# Patient Record
Sex: MALE | Race: ASIAN | NOT HISPANIC OR LATINO | Employment: FULL TIME | URBAN - METROPOLITAN AREA
[De-identification: names, ages, dates, MRNs, and addresses within clinical notes are randomized per-mention and may not be internally consistent; named-entity substitution may affect disease eponyms.]

---

## 2017-02-08 DIAGNOSIS — E78.5 HYPERLIPIDEMIA LDL GOAL <70: Primary | ICD-10-CM

## 2017-02-08 RX ORDER — ATORVASTATIN CALCIUM 20 MG/1
20 TABLET, FILM COATED ORAL DAILY
Qty: 30 TABLET | Refills: 0 | Status: SHIPPED | OUTPATIENT
Start: 2017-02-08 | End: 2017-03-27

## 2017-02-08 NOTE — TELEPHONE ENCOUNTER
atorvastatin (LIPITOR) 20 MG tablet   Last Written Prescription Date:  12/13/16  Last Fill Quantity: 30,   # refills: 0  Last Office Visit with [facility], P or [facility] prescribing provider: 12/7/15  Future Office visit:   none    Routing refill request to provider for review/approval because:   atorvastatin (LIPITOR) 20 MG tablet.   12/15.  appt letter sent previously. rf?

## 2017-03-27 DIAGNOSIS — E78.5 HYPERLIPIDEMIA LDL GOAL <70: ICD-10-CM

## 2017-03-27 RX ORDER — ATORVASTATIN CALCIUM 20 MG/1
20 TABLET, FILM COATED ORAL DAILY
Qty: 30 TABLET | Refills: 0 | Status: SHIPPED | OUTPATIENT
Start: 2017-03-27 | End: 2017-04-26

## 2017-03-27 NOTE — TELEPHONE ENCOUNTER
atorvastatin (LIPITOR) 20 MG tablet      Last Written Prescription Date:  2/8/2017  Last Fill Quantity: 30,   # refills: 0  Last Office Visit : 12/7/2015  Future Office visit:  None scheduled    Routing refill request to provider for review/approval because:  Yearly visit overdue > 12 months since last seen.  Appointment reminder letter previously sent.  Will pend Rx same as last fill for 30 days only.

## 2017-04-26 DIAGNOSIS — E78.5 HYPERLIPIDEMIA LDL GOAL <70: ICD-10-CM

## 2017-04-26 NOTE — TELEPHONE ENCOUNTER
Atorvastatin 20 mg tabs     Last Written Prescription Date: 3-27-17  Last Fill Quantity: 30, # refills: 0  Last Office Visit :     12-17-15 for shoulder pain only  9-9-14 for hyperlipidemia only  2-7-13 last annual physical    Pending Visit:NONE    Kathleen M Doege RN

## 2017-04-27 RX ORDER — ATORVASTATIN CALCIUM 20 MG/1
TABLET, FILM COATED ORAL
Qty: 30 TABLET | Refills: 0 | Status: SHIPPED | OUTPATIENT
Start: 2017-04-27 | End: 2017-06-02

## 2017-06-02 DIAGNOSIS — E78.5 HYPERLIPIDEMIA LDL GOAL <70: ICD-10-CM

## 2017-06-05 RX ORDER — ATORVASTATIN CALCIUM 20 MG/1
TABLET, FILM COATED ORAL
Qty: 30 TABLET | Refills: 0 | Status: SHIPPED | OUTPATIENT
Start: 2017-06-05 | End: 2017-06-29

## 2017-06-05 NOTE — TELEPHONE ENCOUNTER
Lipitor  Last Written Prescription Date:  4/27/17  Last Fill Quantity: 30,   # refills: 0  Last Office Visit : 12/7/15  Future Office visit:  no    Routing refill request to provider for review/approval because:  Letter sent 12/2016 requesting pt to schedule appt, no pending appt at this time, continue to refill?

## 2017-06-29 DIAGNOSIS — E78.5 HYPERLIPIDEMIA LDL GOAL <70: ICD-10-CM

## 2017-07-07 RX ORDER — ATORVASTATIN CALCIUM 20 MG/1
20 TABLET, FILM COATED ORAL DAILY
Qty: 15 TABLET | Refills: 0 | Status: SHIPPED | OUTPATIENT
Start: 2017-07-07 | End: 2017-08-02

## 2017-07-25 DIAGNOSIS — E78.5 HYPERLIPIDEMIA LDL GOAL <70: ICD-10-CM

## 2017-07-26 RX ORDER — ATORVASTATIN CALCIUM 20 MG/1
TABLET, FILM COATED ORAL
Qty: 15 TABLET | Refills: 0 | OUTPATIENT
Start: 2017-07-26

## 2017-08-02 ENCOUNTER — OFFICE VISIT (OUTPATIENT)
Dept: FAMILY MEDICINE | Facility: CLINIC | Age: 52
End: 2017-08-02

## 2017-08-02 VITALS
DIASTOLIC BLOOD PRESSURE: 72 MMHG | WEIGHT: 156.3 LBS | HEART RATE: 65 BPM | SYSTOLIC BLOOD PRESSURE: 111 MMHG | RESPIRATION RATE: 16 BRPM | BODY MASS INDEX: 23.94 KG/M2

## 2017-08-02 DIAGNOSIS — E78.5 HYPERLIPIDEMIA LDL GOAL <130: Primary | ICD-10-CM

## 2017-08-02 DIAGNOSIS — Z00.00 HEALTH CARE MAINTENANCE: ICD-10-CM

## 2017-08-02 RX ORDER — ATORVASTATIN CALCIUM 20 MG/1
20 TABLET, FILM COATED ORAL DAILY
Qty: 90 TABLET | Refills: 3 | Status: SHIPPED | OUTPATIENT
Start: 2017-08-02 | End: 2018-08-16

## 2017-08-02 RX ORDER — ATORVASTATIN CALCIUM 20 MG/1
20 TABLET, FILM COATED ORAL DAILY
COMMUNITY
End: 2019-04-02

## 2017-08-02 ASSESSMENT — PAIN SCALES - GENERAL: PAINLEVEL: NO PAIN (0)

## 2017-08-02 NOTE — MR AVS SNAPSHOT
After Visit Summary   8/2/2017    Marcello Pastor    MRN: [id number]           Patient Information     Date Of Birth          1965        Visit Information        Provider Department      8/2/2017 5:30 PM Dorota Angel MD PhD [facility]        Today's Diagnoses     Hyperlipidemia LDL goal <130    -  1    Health care maintenance          Care Instructions    Primary Care Center Phone Number [phone]  Primary Care Center Medication Refill Request Information:  * Please contact your pharmacy regarding ANY request for medication refills.  ** [facility] Prescription Fax = [phone]  * Please allow 3 business days for routine medication refills.  * Please allow 5 business days for controlled substance medication refills.     Primary Care Center Test Result notification information:  *You will be notified with in 7-10 days of your appointment day regarding the results of your test.  If you are on MyChart you will be notified as soon as the provider has reviewed the results and signed off on them.      Establish care with Dr Jessica or Dr Walsh             Follow-ups after your visit        Your next 10 appointments already scheduled     Aug 02, 2017  6:45 PM CDT   LAB with [facility] ([facility])    [address] [place] [id number] [phone]           Patient must bring picture ID. Patient should be prepared to give a urine specimen  Please do not eat 10-12 hours before your appointment if you are coming in fasting for labs on lipids, cholesterol, or glucose (sugar). Pregnant women should follow their Care Team instructions. Water with medications is okay. Do not drink coffee or other fluids. If you have concerns about taking  your medications, please ask at office or if scheduling via Askem, send a message by clicking on Secure Messaging, Message Your Care Team.              Future tests that were ordered for  you today     Open Future Orders        Priority Expected Expires Ordered    Lipid Profile Routine 2017            Who to contact     Please call your clinic at [phone] to:    Ask questions about your health    Make or cancel appointments    Discuss your medicines    Learn about your test results    Speak to your doctor   If you have compliments or concerns about an experience at your clinic, or if you wish to file a complaint, please contact [facility] Patient Relations at [phone] or email us at [email or web address]         Additional Information About Your Visit        LitepointharCellufun Information     The 517 travelt is an electronic gateway that provides easy, online access to your medical records. With 19pay, you can request a clinic appointment, read your test results, renew a prescription or communicate with your care team.     To sign up for 19pay visit the website at [email or web address]   You will be asked to enter the access code listed below, as well as some personal information. Please follow the directions to create your username and password.     Your access code is: [email or web address]  Expires: 10/26/2017  3:37 PM     Your access code will  in 90 days. If you need help or a new code, please contact your [facility] Clinic or call [phone] for assistance.        Care EveryWhere ID     This is your Care EveryWhere ID. This could be used by other organizations to access your [place] medical records  [id number]        Your Vitals Were     Pulse Respirations BMI (Body Mass Index)             65 16 23.94 kg/m2          Blood Pressure from Last 3 Encounters:   17 111/72   12/07/15 115/75   14 114/72    Weight from Last 3 Encounters:   17 70.9 kg (156 lb 4.8 oz)   12/07/15 76.9 kg (169 lb 9.6 oz)   14 75.9 kg (167 lb 4.8 oz)              We Performed the Following     Basic metabolic panel           Today's Medication Changes          These changes are accurate as of: 8/2/17  6:33 PM.  If you have any questions, ask your nurse or doctor.               Stop taking these medicines if you haven't already. Please contact your care team if you have questions.     magnesium citrate solution   Stopped by:  Dorota Angel MD PhD           PEG-KCl-NaCl-NaSulf-Na Asc-C 100 G Solr   Commonly known as:  MOVIPREP   Stopped by:  Dorota Angel MD PhD           Simethicone 125 MG Caps   Stopped by:  Dorota Angel MD PhD           tadalafil 10 MG tablet   Commonly known as:  CIALIS   Stopped by:  Dorota Angel MD PhD                Where to get your medicines      These medications were sent to [facility]/pharmacy #[id number] [place] [address]  [address] [place] [id number]     Phone:  [phone]     atorvastatin 20 MG tablet                Primary Care Provider    None Specified       No primary provider on file.        Equal Access to Services     [facility] : Hadkimber malloyo Soamee, waaxda luqadaha, qaybta kaalmada adevanceyaveronica, nilesh hogan . So [facility] [phone].    ATENCIÓN: Si habla español, tiene a ga disposición servicios gratuitos de asistencia lingüística. Jesusame al [phone].    We comply with applicable federal civil rights laws and [place] laws. We do not discriminate on the basis of race, color, national origin, age, disability sex, sexual orientation or gender identity.            Thank you!     Thank you for choosing [facility]  for your care. Our goal is always to provide you with excellent care. Hearing back from our patients is one way we can continue to improve our services. Please take a few minutes to complete the written survey that you may receive in the mail after your visit with us. Thank you!             Your Updated Medication List - Protect others around you: Learn  how to safely use, store and throw away your medicines at www.disposemymeds.org.          This list is accurate as of: 8/2/17  6:33 PM.  Always use your most recent med list.                   Brand Name Dispense Instructions for use Diagnosis    aspirin 81 MG tablet      Take 1 tablet by mouth daily.        * LIPITOR 20 MG tablet   Generic drug:  atorvastatin      Take 20 mg by mouth daily        * atorvastatin 20 MG tablet    LIPITOR    90 tablet    Take 1 tablet (20 mg) by mouth daily    Hyperlipidemia LDL goal <130       * Notice:  This list has 2 medication(s) that are the same as other medications prescribed for you. Read the directions carefully, and ask your doctor or other care provider to review them with you.

## 2017-08-02 NOTE — PATIENT INSTRUCTIONS
Primary Care Center Phone Number [phone]  Primary Care Center Medication Refill Request Information:  * Please contact your pharmacy regarding ANY request for medication refills.  ** [facility] Fax = [phone]  * Please allow 3 business days for routine medication refills.  * Please allow 5 business days for controlled substance medication refills.     Primary Care Center Test Result notification information:  *You will be notified with in 7-10 days of your appointment day regarding the results of your test.  If you are on MyChart you will be notified as soon as the provider has reviewed the results and signed off on them.      Establish care with Dr Jessica or Dr Walsh

## 2017-08-03 NOTE — PROGRESS NOTES
Primary Care Center  Established Patient visit    Name: Marcello Pastor MRN: [id number]     Age: 52 year old           Chief Complaint:    YOB: 1965       CC:needs med refill         HPI and ROS:   HPI:    52 year old male with a PMH of hyperlipidemia who comes in today because he needs a  refill on his Lipitor. He is a previous patient of Dr. Chapin. He has not established care with anyone else. He has not been in since 2015 and has been out of Lipitor now for 1 week. He would like to get a refill.    He did not want to establish care today as he would like to find a male physician in the clinic.      ROS:  A 10-point Review of Systems was performed and is negative other than mentioned in HPI         Past Medical History:     Past Medical History:   Diagnosis Date     Hyperlipidemia 8/9/2011     Thoracic outlet syndrome     No symptoms     Uncomplicated asthma              Past Surgical History:      Past Surgical History:   Procedure Laterality Date     COLONOSCOPY  8/31/2011    Procedure:COLONOSCOPY; Surgeon:VU ELLISON; Location: GI     COLONOSCOPY  06/2016               Immunizations:     Immunization History   Administered Date(s) Administered     Influenza (H1N1) 01/27/2010     TDAP Vaccine (Boostrix) 02/07/2013             Allergies:   No Known Allergies          Medications:     Current Outpatient Prescriptions on File Prior to Visit:  aspirin 81 MG tablet Take 1 tablet by mouth daily.   [DISCONTINUED] atorvastatin (LIPITOR) 20 MG tablet Take 1 tablet (20 mg) by mouth daily     No current facility-administered medications on file prior to visit.          Exam:   /72  Pulse 65  Resp 16  Wt 70.9 kg (156 lb 4.8 oz)  BMI 23.94 kg/m2    General: NAD, alert and conversational  HEENT: PEERL, ears clear, OP - few missing teeth, no lesions, neck supple without masses  CV: Normal S1,S2 with RRR no murmurs, rubs or gallops  Resp: Lungs CTA bilaterally with no wheezes or  crackles  Abd: Soft, NTND with no organomegaly or masses  Legs: no edema  Neuro: motor strength 5/5;  Reflexes 2/4 and equal bilaterally   Skin: No concerning lesions or rashes        Labs:   Pending          Assessment and Plan:     Assessment: 52-year-old male with history of hyperlipidemia who has been on Lipitor and has not reestablished care in the clinic and has run out of his prescription.  Plan  1. Hyperlipidemia. His last cholesterol was in 2014. He's been getting Lipitor refills but not had a recent cholesterol. I did refill the Lipitor and ordered a lipid profile as well as a BMP.  2. Establish care. We talked about coming in for establishing care - he prefers a male physician and I gave him the name of couple of men in the clinic. He will set that up in the near future. I did review his history today as well as medications and immunizations.    I spent  30 minutes with this patient.  > 70% of time spent in counseling and coordination of care      RTC: 3 months    Dorota Angel MD, PhD

## 2017-08-18 DIAGNOSIS — E78.5 HYPERLIPIDEMIA LDL GOAL <130: ICD-10-CM

## 2017-08-18 DIAGNOSIS — Z00.00 HEALTH CARE MAINTENANCE: ICD-10-CM

## 2017-08-18 LAB
ANION GAP SERPL CALCULATED.3IONS-SCNC: 6 MMOL/L (ref 3–14)
BUN SERPL-MCNC: 19 MG/DL (ref 7–30)
CALCIUM SERPL-MCNC: 8.6 MG/DL (ref 8.5–10.1)
CHLORIDE SERPL-SCNC: 104 MMOL/L (ref 94–109)
CHOLEST SERPL-MCNC: 157 MG/DL
CO2 SERPL-SCNC: 28 MMOL/L (ref 20–32)
CREAT SERPL-MCNC: 0.93 MG/DL (ref 0.66–1.25)
GFR SERPL CREATININE-BSD FRML MDRD: 85 ML/MIN/1.7M2
GLUCOSE SERPL-MCNC: 97 MG/DL (ref 70–99)
HDLC SERPL-MCNC: 74 MG/DL
LDLC SERPL CALC-MCNC: 72 MG/DL
NONHDLC SERPL-MCNC: 83 MG/DL
POTASSIUM SERPL-SCNC: 4.4 MMOL/L (ref 3.4–5.3)
SODIUM SERPL-SCNC: 138 MMOL/L (ref 133–144)
TRIGL SERPL-MCNC: 58 MG/DL

## 2018-08-16 DIAGNOSIS — E78.5 HYPERLIPIDEMIA LDL GOAL <130: ICD-10-CM

## 2018-08-16 RX ORDER — ATORVASTATIN CALCIUM 20 MG/1
TABLET, FILM COATED ORAL
Qty: 30 TABLET | Refills: 0 | Status: SHIPPED | OUTPATIENT
Start: 2018-08-16 | End: 2019-04-02

## 2018-08-16 NOTE — TELEPHONE ENCOUNTER
LIPITOR     Last Written Prescription Date:  8-2-17  Last Fill Quantity: 90,   # refills: 3  Last Office Visit : 8-2-18-7  Future Office visit:  none    Routing refill request to RN for review/approval because:  Annual appt was due on Due 8-2-18.

## 2019-03-16 ENCOUNTER — TELEPHONE (OUTPATIENT)
Dept: FAMILY MEDICINE | Facility: CLINIC | Age: 54
End: 2019-03-16

## 2019-03-16 DIAGNOSIS — E78.5 HYPERLIPIDEMIA LDL GOAL <130: ICD-10-CM

## 2019-03-19 RX ORDER — ATORVASTATIN CALCIUM 20 MG/1
20 TABLET, FILM COATED ORAL DAILY
Qty: 15 TABLET | Refills: 0 | Status: SHIPPED | OUTPATIENT
Start: 2019-03-19 | End: 2019-04-02

## 2019-03-19 NOTE — TELEPHONE ENCOUNTER
lipitor     Last Written Prescription Date:  8/16/18  Last Fill Quantity: 30   # refills: 0  Last Office Visit : 8/2/17  Future Office visit:  No future visit    Routing refill request to nurse for review/approval because:  Drug failed the [facility], [facility] or [facility] refill protocol   Radha refill previously given, no future appt

## 2019-04-02 DIAGNOSIS — E78.5 HYPERLIPIDEMIA LDL GOAL <130: ICD-10-CM

## 2019-04-02 RX ORDER — ATORVASTATIN CALCIUM 20 MG/1
20 TABLET, FILM COATED ORAL DAILY
Qty: 30 TABLET | Refills: 0 | Status: SHIPPED | OUTPATIENT
Start: 2019-04-02 | End: 2019-04-16

## 2019-04-02 NOTE — TELEPHONE ENCOUNTER
lipitor   Last Written Prescription Date: 3/19/19  Last Fill Quantity:15,   # refills: 0  Last Office Visit : 8/2/17  Future Office visit:  4/16/19    Routing refill request to nurse for review/approval because:  Radha refills have been provided. Pt now has pending appt

## 2019-04-02 NOTE — TELEPHONE ENCOUNTER
Health Call Center    Phone Message    May a detailed message be left on voicemail: yes    Reason for Call: Medication Refill Request    Has the patient contacted the pharmacy for the refill? Yes   Name of medication being requested: atorvastatin (LIPITOR) 20 MG tablet  Provider who prescribed the medication: Dr. Dorota Angel  Pharmacy: [facility]/PHARMACY #[id number] [facility] [address] [place] [address] AT [address]  Date medication is needed: Next available--pt is establishing care on 4/16 with resident, and currently has a week's supply left. He is wondering if he can be given enough medication to last until he is seen. Please advise.    Action Taken: Message routed to:  [facility] Med Refills

## 2019-04-16 ENCOUNTER — ANCILLARY PROCEDURE (OUTPATIENT)
Dept: GENERAL RADIOLOGY | Facility: CLINIC | Age: 54
End: 2019-04-16
Attending: INTERNAL MEDICINE
Payer: COMMERCIAL

## 2019-04-16 ENCOUNTER — OFFICE VISIT (OUTPATIENT)
Dept: INTERNAL MEDICINE | Facility: CLINIC | Age: 54
End: 2019-04-16
Payer: COMMERCIAL

## 2019-04-16 VITALS
WEIGHT: 167.1 LBS | RESPIRATION RATE: 16 BRPM | DIASTOLIC BLOOD PRESSURE: 79 MMHG | SYSTOLIC BLOOD PRESSURE: 122 MMHG | BODY MASS INDEX: 25.6 KG/M2 | HEART RATE: 91 BPM

## 2019-04-16 DIAGNOSIS — Z00.00 ENCOUNTER FOR GENERAL HEALTH EXAMINATION: Primary | ICD-10-CM

## 2019-04-16 DIAGNOSIS — J45.30 MILD PERSISTENT ASTHMA WITHOUT COMPLICATION: ICD-10-CM

## 2019-04-16 DIAGNOSIS — E78.5 HYPERLIPIDEMIA LDL GOAL <130: ICD-10-CM

## 2019-04-16 RX ORDER — ATORVASTATIN CALCIUM 20 MG/1
20 TABLET, FILM COATED ORAL DAILY
Qty: 90 TABLET | Refills: 3 | Status: SHIPPED | OUTPATIENT
Start: 2019-04-16 | End: 2020-05-30

## 2019-04-16 RX ORDER — ALBUTEROL SULFATE 90 UG/1
2 AEROSOL, METERED RESPIRATORY (INHALATION) EVERY 6 HOURS
Qty: 8.5 G | Refills: 3 | Status: SHIPPED | OUTPATIENT
Start: 2019-04-16 | End: 2020-04-16

## 2019-04-16 SDOH — HEALTH STABILITY: MENTAL HEALTH: HOW OFTEN DO YOU HAVE A DRINK CONTAINING ALCOHOL?: 2-3 TIMES A WEEK

## 2019-04-16 ASSESSMENT — ENCOUNTER SYMPTOMS
SNORES LOUDLY: 1
NAIL CHANGES: 0
POSTURAL DYSPNEA: 0
SHORTNESS OF BREATH: 0
COUGH: 1
HEMOPTYSIS: 0
COUGH DISTURBING SLEEP: 1
POSTURAL DYSPNEA: 0
HEMOPTYSIS: 0
SNORES LOUDLY: 1
SPUTUM PRODUCTION: 0
SHORTNESS OF BREATH: 0
SPUTUM PRODUCTION: 0
COUGH DISTURBING SLEEP: 1
SKIN CHANGES: 0
WHEEZING: 1
NAIL CHANGES: 0
POOR WOUND HEALING: 0
COUGH: 1
DYSPNEA ON EXERTION: 0
POOR WOUND HEALING: 0
WHEEZING: 1
SKIN CHANGES: 0
DYSPNEA ON EXERTION: 0

## 2019-04-16 ASSESSMENT — PAIN SCALES - GENERAL: PAINLEVEL: NO PAIN (0)

## 2019-04-16 NOTE — PROGRESS NOTES
PRIMARY CARE CENTER     History of Present Illness:       Marcello Pastor is a 54 year old male with past medical history of hyperlipidemia, asthma, thoracic outlet syndrome [facility]    Hyperlipidemia  Patient has been on Lipitor for several years without adverse effects.  He is running out of his prescription and states that he needed to connect with a primary care physician in order to receive a refill.  Cardiovascular risk factors are low in this patient.  He does not smoke, he does not have hypertension, he is non-diabetic.    Asthma concerns  Patient has a history of childhood asthma from ages [age] years old.  Over the last 6 months he reports wheezing and cough at nighttime and in the mornings.  He reports that his symptoms are relieved with use of his son's albuterol inhaler.  At the beginning of these episodes he reported a viral upper respiratory infection that he thinks may have triggered his asthma symptoms.  He denies fevers, chills, rhinorrhea, congestion, watery eyes.  No pets are in the home.  No new detergents are in use.    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  He is  with one child.  He is a non-smoker, drinks 3 glasses of wine total per week, denies use of recreational drugs and herbal medications.  He is due for colonoscopy in 2021.  No sexual concerns today.    Problem, Medication and Allergy Lists were reviewed and are current.  Patient is a new patient to this clinic and so  I reviewed/updated the Past Medical History, the Family History and the Social History.       Review of Systems:     Answers for HPI/ROS submitted by the patient on 4/16/2019   General Symptoms: No  Skin Symptoms: Yes  HENT Symptoms: No  EYE SYMPTOMS: No  HEART SYMPTOMS: No  LUNG SYMPTOMS: Yes  INTESTINAL SYMPTOMS: No  URINARY SYMPTOMS: No  REPRODUCTIVE SYMPTOMS: No  SKELETAL SYMPTOMS: No  BLOOD SYMPTOMS: No  NERVOUS SYSTEM SYMPTOMS: No  MENTAL  HEALTH SYMPTOMS: No  Changes in hair: No  Changes in moles/birth marks: No  Itching: Yes  Rashes: No  Changes in nails: No  Acne: No  Change in facial hair: No  Warts: No  Non-healing sores: No  Scarring: No  Flaking of skin: No  Color changes of hands/feet in cold : No  Sun sensitivity: No  Skin thickening: No  Cough: Yes  Sputum or phlegm: No  Coughing up blood: No  Difficulty breating or shortness of breath: No  Snoring: Yes  Wheezing: Yes  Difficulty breathing on exertion: No  Nighttime Cough: Yes  Difficulty breathing when lying flat: No     Past Medical and Surgical History:     Patient Active Problem List   Diagnosis     Hyperlipidemia       Social History:     Social History     Socioeconomic History     Marital status:      Spouse name: Not on file     Number of children: 1     Years of education: Not on file     Highest education level: Not on file   Occupational History     Occupation: professor     Employer: [employer]   Tobacco Use     Smoking status: Former Smoker     Types: Cigarettes     Last attempt to quit: 1997     Years since quittin.7     Smokeless tobacco: Never Used   Substance and Sexual Activity     Alcohol use: Yes     Alcohol/week: 1.8 oz     Types: 3 Glasses of wine per week     Frequency: 2-3 times a week     Drug use: No     Sexual activity: Yes     Partners: Female     Birth control/protection: Condom   Social History Narrative    Professor in [employer].    1 child              Family History:      Family History   Problem Relation Age of Onset     Asthma Mother      Colon Cancer Mother      Coronary Artery Disease Early Onset Father      Diabetes Father      Coronary Artery Disease Early Onset Paternal Grandmother      Asthma Son          Medications/Allergies:     Medication List:   Current Outpatient Medications   Medication Sig     albuterol (PROAIR HFA/PROVENTIL HFA/VENTOLIN HFA) 108 (90 Base) MCG/ACT inhaler Inhale 2 puffs into the  lungs every 6 hours As needed for wheezing     aspirin 81 MG tablet Take 1 tablet by mouth daily.     atorvastatin (LIPITOR) 20 MG tablet Take 1 tablet (20 mg) by mouth daily     mometasone (ASMANEX) 110 MCG/INH inhaler Inhale 1 puff into the lungs every evening     No current facility-administered medications for this visit.        Allergy List: All allergies reviewed and addressed      Physical Exam:         /79   Pulse 91   Resp 16   Wt 75.8 kg (167 lb 1.6 oz)   BMI 25.60 kg/m    Body mass index is 25.6 kg/m .  Vitals were reviewed  GENERAL: well-appearing, alert and no distress  EYES: EOMI,  sclera-nonicteric  HENT: ear canals and TM's normal; oropharynx without ulcers or lesions  NECK: no cervical lymphadenopathy, no asymmetry, masses, or scars  RESP: normal respiratory effort on room air, CTAB - no rales, rhonchi or wheezes  CV: regular rates and rhythm, normal S1 S2, no S3 or S4, no murmur, click or rub  ABDOMEN: normoactive bowel sounds, soft, nontender, nondistended, no HSM or masses   MS/Ext: WWP  SKIN: no suspicious lesions or rashes  NEURO: AOx3, normal strength and tone and reflexes, spontaneous movement of all extremities  PSYCH: interactive, affect normal/bright, speech normal, mentation and judgment appear normal  LYMPHATICS: no cervical, supraclavicular adenopathy    Results:     No new laboratory or imaging results associated with this visit.    Assessment and Plan:     Marcello was seen today for establish care.    Diagnoses and all orders for this visit:    Encounter for general health examination  Discussed 1-time screening for HCV and HIV and patient would like to add on these tests to lipids today.   -     HCV Screen; Future  -     HIV Antigen Antibody Combo; Future    Hyperlipidemia LDL goal <130  Lipids have been well-controlled on lipitor. He continues to use it, thus testing completed today will represent cholesterol levels on treatment.   -     Comprehensive metabolic panel;  Future  -     Lipid Profile NON-FASTING; Future  -     atorvastatin (LIPITOR) 20 MG tablet; Take 1 tablet (20 mg) by mouth daily    Mild persistent asthma without complication  Surprising that patient's asthma would recur suddenly without particular trigger, thus will complete additional testing for work-up. Due to frequency of patient's symptoms, will place in on daily medication in addition to PRN.   -     albuterol (PROAIR HFA/PROVENTIL HFA/VENTOLIN HFA) 108 (90 Base) MCG/ACT inhaler; Inhale 2 puffs into the lungs every 6 hours As needed for wheezing  -     mometasone (ASMANEX) 110 MCG/INH inhaler; Inhale 1 puff into the lungs every evening  -     XR Chest 2 Views; Future  -     General PFT Lab (Please always keep checked); Future  -     Pulmonary Function Test; Future  - Clinic f/u in 3 months to assess if medications can be stepped down      Options for treatment and follow-up care were reviewed with the patient. Marcello Pastor engaged in the decision making process and verbalized understanding of the options discussed and agreed with the final plan.    Tish Mccray MD, PhD  Internal Medicine, PGY-1  Pager: [phone]    Apr 16, 2019       Patient was seen/plan of care discussed with Dr. Walls.    Attending Addendum:  Patient seen and examined with resident in clinic today.  Pertinent portions of history and exam were independently verified by myself.  I agree with the exam and plan as outlined above with the following modifications: none.  Adeola Walls MD  Internal Medicine

## 2019-04-16 NOTE — PROGRESS NOTES
The 10-year ASCVD risk score (Bill HENRY Jr., et al., 2013) is: 2.5%    Values used to calculate the score:      Age: 54 years      Sex: Male      Is Non- : No      Diabetic: No      Tobacco smoker: No      Systolic Blood Pressure: 122 mmHg      Is BP treated: No      HDL Cholesterol: 74 mg/dL      Total Cholesterol: 157 mg/dL

## 2019-04-16 NOTE — NURSING NOTE
Chief Complaint   Patient presents with     Establish Care     pt is here to re-establish care with PCP       Beth Josue CMA at 12:01 PM on 4/16/2019

## 2019-04-16 NOTE — PATIENT INSTRUCTIONS
[facility] Phone Number [phone]  [facility] Medication Refill Request Information:  * Please contact your pharmacy regarding ANY request for medication refills.  ** [facility] Prescription Fax = [phone]  * Please allow 3 business days for routine medication refills.  * Please allow 5 business days for controlled substance medication refills.     [facility] Test Result notification information:  *You will be notified with in 7-10 days of your appointment day regarding the results of your test.  If you are on MyChart you will be notified as soon as the provider has reviewed the results and signed off on them.               [facility]         Internal Medicine Resident                   Continuity Clinic    Who We Are    Resident Continuity Clinic is a part of the [facility].  Resident physicians see patients independently and establish a relationship with them over the course of their three-year residency program.  As with the Primary Care Clinic, our Resident Continuity Clinic models a group practice.  If your doctor is not available, you will be able to see another resident physician.  At the end of a resident s training, patients will be transitioned to a new resident physician for ongoing care.     We treat patients with a wide array of medical needs from routine physicals, to acute illnesses, to diabetes and blood pressure management, to complex medical illness.  What is a Resident Physician?    Resident physicians hold medical degrees and are doctors. They are training to become specialists in Internal Medicine. They work under the supervision of board-certified faculty physicians.  Expectations for Your Care    We strive to provide accessible, quality care at all times.    In order to provide this care, it is best to see your primary care resident doctor consistently rather switching between providers.  In the event you do see another physician, you  should schedule a follow-up visit with your usual primary care doctor.    If you are transitioning your care from another clinic, it is helpful to have your records available for your doctor to review.    We do not prescribe controlled substances, such as ADD medications or narcotic pain medications, on your first visit.  We will review your health records and concerns prior to devising a treatment plan with you in order to provide the best care.      Clinic Services     Extended clinic hours; patient  to help navigate your visit;  parking; laboratory and imaging services with evening and weekend hours    Multiple medical and surgical specialties in one building    Complementary services, including Nutrition, Integrative Medicine, Pharmacy consultations, Mental and Behavioral Health, Sports Medicine and Physical Therapy    Thank You    We would like to thank you for choosing the [facility] Resident Continuity Clinic for your primary care. You are making a priceless contribution to the training of the next generation of health care practitioners.     Contact us at [phone] for appointments or questions.    Resident Clinic Hours are Tuesdays and Thursdays, 7:30am-5:00pm    Residents   Bhavik Hill MD  (Male)   Yovana Ascencio MD (Female)  Beth Pinzon MD   (Female)   Agnes Coats MD   (Female)   Paul Garcia MD  (Male)   Zafar Issa MD  (Male)   Sobeida Corrales MD    (Female)   Mikie Gibbs MD  (Male)   Nitin Edgar MD  (Male)    Sharon Wheat MD  (Female)   Sean David MD  (Male)   Rosaline Mccray MD  (Female)   Josiah Lindsay MD   (Female)   Alfred Chang MD  (Male)   Duncan Chakraborty MD  (Male)   Zafar Leung MD (Male)   Vadim Villegas MD  (Male)   Samreen Foreman MD (Female)    Paola Cooper MD (Female)   Paige Siegel MD  (Male)   May Weaver MD    (Female)   Christina Moss MD  (Female)    Supervising  Physicians   MD Diamond Chew, MD Jordin Landrum, MD Jeff Walsh, MD Adeola Walls, MD Mandy Weiner, MD Rosalio Benítez, MD Leonarda Lehman MD

## 2019-04-17 DIAGNOSIS — E78.5 HYPERLIPIDEMIA LDL GOAL <130: ICD-10-CM

## 2019-04-17 DIAGNOSIS — Z00.00 ENCOUNTER FOR GENERAL HEALTH EXAMINATION: ICD-10-CM

## 2019-04-17 LAB
ALBUMIN SERPL-MCNC: 3.9 G/DL (ref 3.4–5)
ALP SERPL-CCNC: 41 U/L (ref 40–150)
ALT SERPL W P-5'-P-CCNC: 41 U/L (ref 0–70)
ANION GAP SERPL CALCULATED.3IONS-SCNC: 5 MMOL/L (ref 3–14)
AST SERPL W P-5'-P-CCNC: 28 U/L (ref 0–45)
BILIRUB SERPL-MCNC: 1.1 MG/DL (ref 0.2–1.3)
BUN SERPL-MCNC: 20 MG/DL (ref 7–30)
CALCIUM SERPL-MCNC: 9 MG/DL (ref 8.5–10.1)
CHLORIDE SERPL-SCNC: 105 MMOL/L (ref 94–109)
CHOLEST SERPL-MCNC: 168 MG/DL
CO2 SERPL-SCNC: 27 MMOL/L (ref 20–32)
CREAT SERPL-MCNC: 0.92 MG/DL (ref 0.66–1.25)
GFR SERPL CREATININE-BSD FRML MDRD: >90 ML/MIN/{1.73_M2}
GLUCOSE SERPL-MCNC: 99 MG/DL (ref 70–99)
HCV AB SERPL QL IA: NONREACTIVE
HDLC SERPL-MCNC: 67 MG/DL
HIV 1+2 AB+HIV1 P24 AG SERPL QL IA: NONREACTIVE
LDLC SERPL CALC-MCNC: 88 MG/DL
NONHDLC SERPL-MCNC: 101 MG/DL
POTASSIUM SERPL-SCNC: 4 MMOL/L (ref 3.4–5.3)
PROT SERPL-MCNC: 7.3 G/DL (ref 6.8–8.8)
SODIUM SERPL-SCNC: 137 MMOL/L (ref 133–144)
TRIGL SERPL-MCNC: 67 MG/DL

## 2019-04-22 NOTE — NURSING NOTE
Chief Complaint   Patient presents with     Recheck Medication     pt is here for a medication follow up      Beth Josue CMA at 5:32 PM on 8/2/2017       no

## 2019-04-23 DIAGNOSIS — J45.30 MILD PERSISTENT ASTHMA WITHOUT COMPLICATION: ICD-10-CM

## 2019-04-23 LAB
EXPTIME-%CHANGE-CHLG: 158 %
EXPTIME-CHLG: 7.7 SEC
EXPTIME-PRE: 2.98 SEC
FEF2575-%CHANGE-CHLG: -29 %
FEF2575-%PRED-CHLG: 73 %
FEF2575-%PRED-PRE: 104 %
FEF2575-PRE: 3.28 L/SEC
FEF2575-PRED: 3.13 L/SEC
FEFMAX-%PRED-PRE: 105 %
FEFMAX-PRE: 10.02 L/SEC
FEFMAX-PRED: 9.5 L/SEC
FEV1-%PRED-PRE: 95 %
FEV1-PRE: 3.32 L
FEV1FEV6-PRE: 83 %
FEV1FEV6-PRED: 80 %
FEV1FVC-PRE: 83 %
FEV1FVC-PRED: 79 %
FIFMAX-%CHANGE-CHLG: -1 %
FIFMAX-CHLG: 5.43 L/SEC
FIFMAX-PRE: 5.49 L/SEC
FVC-%PRED-PRE: 90 %
FVC-PRE: 4 L
FVC-PRED: 4.4 L

## 2019-04-29 ENCOUNTER — OFFICE VISIT (OUTPATIENT)
Dept: INTERNAL MEDICINE | Facility: CLINIC | Age: 54
End: 2019-04-29
Payer: COMMERCIAL

## 2019-04-29 VITALS
WEIGHT: 162 LBS | DIASTOLIC BLOOD PRESSURE: 78 MMHG | HEART RATE: 82 BPM | BODY MASS INDEX: 24.81 KG/M2 | OXYGEN SATURATION: 95 % | SYSTOLIC BLOOD PRESSURE: 129 MMHG

## 2019-04-29 DIAGNOSIS — R06.83 SNORING: Primary | ICD-10-CM

## 2019-04-29 DIAGNOSIS — L50.9 URTICARIA: ICD-10-CM

## 2019-04-29 DIAGNOSIS — K57.30 DIVERTICULOSIS OF LARGE INTESTINE WITHOUT HEMORRHAGE: ICD-10-CM

## 2019-04-29 LAB
BASOPHILS # BLD AUTO: 0.1 10E9/L (ref 0–0.2)
BASOPHILS NFR BLD AUTO: 1.1 %
CRP SERPL-MCNC: <2.9 MG/L (ref 0–8)
DIFFERENTIAL METHOD BLD: NORMAL
EOSINOPHIL # BLD AUTO: 0.1 10E9/L (ref 0–0.7)
EOSINOPHIL NFR BLD AUTO: 1.2 %
ERYTHROCYTE [DISTWIDTH] IN BLOOD BY AUTOMATED COUNT: 13.2 % (ref 10–15)
ERYTHROCYTE [SEDIMENTATION RATE] IN BLOOD BY WESTERGREN METHOD: 9 MM/H (ref 0–20)
HCT VFR BLD AUTO: 45 % (ref 40–53)
HGB BLD-MCNC: 14.7 G/DL (ref 13.3–17.7)
IMM GRANULOCYTES # BLD: 0 10E9/L (ref 0–0.4)
IMM GRANULOCYTES NFR BLD: 0.2 %
LYMPHOCYTES # BLD AUTO: 1.8 10E9/L (ref 0.8–5.3)
LYMPHOCYTES NFR BLD AUTO: 32 %
MCH RBC QN AUTO: 29.6 PG (ref 26.5–33)
MCHC RBC AUTO-ENTMCNC: 32.7 G/DL (ref 31.5–36.5)
MCV RBC AUTO: 91 FL (ref 78–100)
MONOCYTES # BLD AUTO: 0.4 10E9/L (ref 0–1.3)
MONOCYTES NFR BLD AUTO: 7.2 %
NEUTROPHILS # BLD AUTO: 3.3 10E9/L (ref 1.6–8.3)
NEUTROPHILS NFR BLD AUTO: 58.3 %
NRBC # BLD AUTO: 0 10*3/UL
NRBC BLD AUTO-RTO: 0 /100
PLATELET # BLD AUTO: 196 10E9/L (ref 150–450)
RBC # BLD AUTO: 4.97 10E12/L (ref 4.4–5.9)
TSH SERPL DL<=0.005 MIU/L-ACNC: 2.34 MU/L (ref 0.4–4)
WBC # BLD AUTO: 5.7 10E9/L (ref 4–11)

## 2019-04-29 RX ORDER — MULTIVITAMIN,THERAPEUTIC
1 TABLET ORAL DAILY
COMMUNITY
Start: 2019-04-29

## 2019-04-29 ASSESSMENT — PAIN SCALES - GENERAL: PAINLEVEL: NO PAIN (0)

## 2019-04-29 NOTE — PATIENT INSTRUCTIONS
[facility] Medication Refill Request Information:  * Please contact your pharmacy regarding ANY request for medication refills.  ** [facility] Prescription Fax = [phone]  * Please allow 3 business days for routine medication refills.  * Please allow 5 business days for controlled substance medication refills.     [facility] Test Result notification information:  *You will be notified with in 7-10 days of your appointment day regarding the results of your test.  If you are on MyChart you will be notified as soon as the provider has reviewed the results and signed off on them.    [facility]: [phone]

## 2019-04-29 NOTE — PROGRESS NOTES
HPI  54-year-old  presents today for physical examination.  He has been doing well.  He has a son with asthma and he has had some intermittent cough and wheezing that is been using a Ventolin inhaler for.  We just reviewed his recent methacholine challenge test however which was entirely negative.  He states that the cough on the breathing has improved recently.  He is otherwise been doing limited physical activity due to his job responsibilities.  In the past he been more physically active but has not found the time recently and we discussed this.  He has a history of recurrent urticaria.  Is been present for several years occurs intermittently he is not able to identify any allergens or precipitating events.  He does not think it is related to stress this is been under a lot of stress recently has not had any recent urticaria.  He has had no other rash and his review of systems is otherwise entirely negative except for the breathing symptoms.  His wife is expressed concern however regarding sleep apnea he snores and she is reporting observed apneic episodes.  He is tired during the day but he does need his wife's alarm to wake him up in the morning as he goes to bed late at night.  He admits that he could be chronically sleep deprived.  Otherwise he has had no recent symptoms or problems.  Past Medical History:   Diagnosis Date     Diverticulosis      Hyperlipidemia 8/9/2011     Thoracic outlet syndrome     No symptoms     Uncomplicated asthma      Past Surgical History:   Procedure Laterality Date     COLONOSCOPY  8/31/2011    Procedure:COLONOSCOPY; Surgeon:VU ELLISON; Location: GI     COLONOSCOPY  06/2016     Family History   Problem Relation Age of Onset     Asthma Mother      Colon Cancer Mother      Coronary Artery Disease Early Onset Father      Diabetes Father      Coronary Artery Disease Early Onset Paternal Grandmother      Asthma Son      Social History      Socioeconomic History     Marital status:      Spouse name: None     Number of children: 1     Years of education: None     Highest education level: None   Occupational History     Occupation: professor     Employer: [employer]   Social Needs     Financial resource strain: None     Food insecurity:     Worry: None     Inability: None     Transportation needs:     Medical: None     Non-medical: None   Tobacco Use     Smoking status: Former Smoker     Types: Cigarettes     Last attempt to quit: 1997     Years since quittin.7     Smokeless tobacco: Never Used   Substance and Sexual Activity     Alcohol use: Yes     Alcohol/week: 1.8 oz     Types: 3 Glasses of wine per week     Frequency: 2-3 times a week     Drug use: No     Sexual activity: Yes     Partners: Female     Birth control/protection: Condom   Lifestyle     Physical activity:     Days per week: None     Minutes per session: None     Stress: None   Relationships     Social connections:     Talks on phone: None     Gets together: None     Attends Mandaen service: None     Active member of club or organization: None     Attends meetings of clubs or organizations: None     Relationship status: None     Intimate partner violence:     Fear of current or ex partner: None     Emotionally abused: None     Physically abused: None     Forced sexual activity: None   Other Topics Concern     Parent/sibling w/ CABG, MI or angioplasty before 65F 55M? 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    1 child      Answers for HPI/ROS submitted by the patient on 2019   General Symptoms: No  Skin Symptoms: Yes  HENT Symptoms: No  EYE SYMPTOMS: No  HEART SYMPTOMS: No  LUNG SYMPTOMS: Yes  INTESTINAL SYMPTOMS: No  URINARY SYMPTOMS: No  REPRODUCTIVE SYMPTOMS: No  SKELETAL SYMPTOMS: No  BLOOD SYMPTOMS: No  NERVOUS SYSTEM SYMPTOMS: No  MENTAL HEALTH SYMPTOMS: No  Changes in hair: No  Changes in moles/birth  marks: No  Itching: Yes  Rashes: No  Changes in nails: No  Acne: No  Change in facial hair: No  Warts: No  Non-healing sores: No  Scarring: No  Flaking of skin: No  Color changes of hands/feet in cold : No  Sun sensitivity: No  Skin thickening: No  Cough: Yes  Sputum or phlegm: No  Coughing up blood: No  Difficulty breating or shortness of breath: No  Snoring: Yes  Wheezing: Yes  Difficulty breathing on exertion: No  Nighttime Cough: Yes  Difficulty breathing when lying flat: No    Exam:  /78   Pulse 82   Wt 73.5 kg (162 lb)   SpO2 95%   BMI 24.81 kg/m    162 lbs 0 oz  Physical Exam   The patient is alert, oriented with a clear sensorium.   Skin shows no lesions or rashes and good turgor.   Head is normocephalic and atraumatic.   Eyes show PERRLA with benign optic fundi.   Ears show cerumen bilaterally.   Mouth shows clear oral mucosa.   Neck shows no nodes, thyromegaly or bruits.   Back is non tender.  Lungs are clear to percussion and auscultation.   Heart shows normal S1 and S2 without murmur or gallop.   Abdomen is soft and nontender without masses or organomegaly.   Genitalia show normal testes. No evidence of inguinal hernia.  Rectal shows small smooth prostate without nodules or masses.  Extremities show no edema and no evidence of active synovitis.   Neurologic examination shows cranial nerves II-XII intact. Motor shows 5/5 strength. Reflexes are symmetric. Cerebellar testing shows normal tandem gait.  Romberg negative.    Labs reviewed with him:  Results for orders placed or performed in visit on 04/23/19   General PFT Lab (Please always keep checked)   Result Value Ref Range    FVC-Pred 4.40 L    FVC-Pre 4.00 L    FVC-%Pred-Pre 90 %    FEV1-Pre 3.32 L    FEV1-%Pred-Pre 95 %    FEV1FVC-Pred 79 %    FEV1FVC-Pre 83 %    FEFMax-Pred 9.50 L/sec    FEFMax-Pre 10.02 L/sec    FEFMax-%Pred-Pre 105 %    FEF2575-Pred 3.13 L/sec    FEF2575-Pre 3.28 L/sec    NGF4666-%Pred-Pre 104 %    LGX9459-%Pred-Chlg 73 %     EEJ8516-%Change-Chlg -29 %    ExpTime-Pre 2.98 sec    ExpTime-Chlg 7.70 sec    ExpTime-%Change-Chlg 158 %    FIFMax-Pre 5.49 L/sec    FIFMax-Chlg 5.43 L/sec    FIFMax-%Change-Chlg -1 %    FEV1FEV6-Pred 80 %    FEV1FEV6-Pre 83 %    Narrative    Impression:  Negative methacholine challenge.   Yvette Jaramillo____________________________________________M.D.    This interpretation has been electronically signed:  YVETTE JARAMILLO 04/23/2019  02:52:43 PM         ASSESSMENT  1 urticaria uncertain etiology  2 normal pulmonary functions with probable recurrent bronchitis responsive to albuterol  3 hyperlipidemia on atorvastatin  4 snoring daytime fatigue stop bang score of 5 will refer to sleep clinic    Plan  I am to have him see the sleep clinic encouraged him to increase his physical activity and exercise we will have him follow-up in a year and follow-up sooner immediately if any acute symptoms or problems causing urticaria to check a CBC thyroid and sed rate    This note was completed using Dragon voice recognition software.  Although reviewed after completion, some word and grammatical errors may occur.    Jeff Walsh MD  General Internal Medicine  [facility] Center  [phone]

## 2019-04-29 NOTE — NURSING NOTE
Chief Complaint   Patient presents with     Physical     Pt is here for annual physical.      Winter aMlagon LPN at 4:09 PM on 4/29/2019.

## 2019-05-15 ENCOUNTER — PRE VISIT (OUTPATIENT)
Dept: SLEEP MEDICINE | Facility: CLINIC | Age: 54
End: 2019-05-15

## 2019-05-15 NOTE — TELEPHONE ENCOUNTER
"  1.  Reason for the visit:   His wife is expressed concern however regarding sleep apnea he snores and she is reporting observed apneic episodes.  He is tired during the day but he does need his wife's alarm to wake him up in the morning as he goes to bed late at night.  He admits that he could be chronically sleep deprived.  Otherwise he has had no recent symptoms or problems.  2.  Referring provider and clinic name:  Dr. Walsh [facility]  3.  Previous Sleep Doctor or Pulmonlogist (clinic name)?  None  4.  Records, Procedures, Imaging, and Labs (see below)  No records to obtain        All NOTES from previous office visits that pertain to why they are being seen in the Sleep Center    Previous Sleep Studies, Chest CT, Echos and reports that pertain to why they are seeing Sleep Center    All Sleep records that have been done in the last 2 years that pertain to why they are seeing Sleep Center            Are they being seen for continuation of care for Cpap/Bipap/Avap/Trilogy/Dental Device? none    If yes to above Who and Where was Device issued/currently getting supplies from? na    Are you currently on \"Supplemental Oxygen\" during the day or night?   na                                                                                                                                                      Please remind pt to bring Cpap machine and ask to arrive 15 minutes early to appointment due traffic and congestion                                                 5. Pt Sleep Center Packet received Message left asking pt to arrive 30 minutes early to appointment if no packet received.        Yes: \"please make sure that you bring this to your appointment completed, either the doctor will not see you until this completed or you may be asked to reschedule your appointment.\"     No: mail or email to the pt and explain, \"please make sure that you bring this to your appointment completed, either the doctor will " "not see you until this completed or you may be asked to reschedule your appointment.\"     ~If pt coming early to fill packet out, ask that they come 30 minutes prior to their appointment~     6. Has the pt's medication list been updated and preferred pharmacy added?     7. Has the allergy list been reviewed?    \"Thank you for choosing [facility] and we look forward to seeing you at your upcoming appointment\"     "

## 2019-05-24 ENCOUNTER — OFFICE VISIT (OUTPATIENT)
Dept: SLEEP MEDICINE | Facility: CLINIC | Age: 54
End: 2019-05-24
Attending: INTERNAL MEDICINE
Payer: COMMERCIAL

## 2019-05-24 VITALS
HEART RATE: 64 BPM | DIASTOLIC BLOOD PRESSURE: 74 MMHG | OXYGEN SATURATION: 98 % | HEIGHT: 70 IN | SYSTOLIC BLOOD PRESSURE: 116 MMHG | RESPIRATION RATE: 16 BRPM | WEIGHT: 160 LBS | BODY MASS INDEX: 22.9 KG/M2

## 2019-05-24 DIAGNOSIS — R06.83 SNORING: ICD-10-CM

## 2019-05-24 DIAGNOSIS — G47.30 OBSERVED SLEEP APNEA: Primary | ICD-10-CM

## 2019-05-24 PROCEDURE — 99244 OFF/OP CNSLTJ NEW/EST MOD 40: CPT | Performed by: INTERNAL MEDICINE

## 2019-05-24 ASSESSMENT — MIFFLIN-ST. JEOR: SCORE: 1572.01

## 2019-05-24 NOTE — PATIENT INSTRUCTIONS
"MY TREATMENT INFORMATION FOR SLEEP APNEA-  Marcello Pastor    DOCTOR : Disha Carrillo  SLEEP CENTER :      MY CONTACT NUMBER:     Am I having a sleep study at a sleep center?  Make sure you have an appointment for the study before you leave!    Am I having a home sleep study?  Watch this video:  https://[email or web address].com/watch?v=CteI_[email or web address]&[email or web address]  Please verify your insurance coverage with your insurance carrier    Frequently asked questions:  1. What is Obstructive Sleep Apnea (ANDRAE)? ANDREA is the most common type of sleep apnea. Apnea means, \"without breath.\"  Apnea is most often caused by narrowing or collapse of the upper airway as muscles relax during sleep.   Almost everyone has occasional apneas. Most people with sleep apnea have had brief interruptions at night frequently for many years.  The severity of sleep apnea is related to how frequent and severe the events are.   2. What are the consequences of ANDREA? Symptoms include: feeling sleepy during the day, snoring loudly, gasping or stopping of breathing, trouble sleeping, and occasionally morning headaches or heartburn at night.  Sleepiness can be serious and even increase the risk of falling asleep while driving. Other health consequences may include development of high blood pressure and other cardiovascular disease in persons who are susceptible. Untreated ANDREA  can contribute to heart disease, stroke and diabetes.   3. What are the treatment options? In most situations, sleep apnea is a lifelong disease that must be managed with daily therapy. Medications are not effective for sleep apnea and surgery is generally not considered until other therapies have been tried. Your treatment is your choice . Continuous Positive Airway (CPAP) works right away and is the therapy that is effective in nearly everyone. An oral device to hold your jaw forward is usually the next most reliable option. Other options include postioning devices " (to keep you off your back), weight loss, and surgery including a tongue pacing device. There is more detail about some of these options below.    Important tips for using CPAP and similar devices   Know your equipment:  CPAP is continuous positive airway pressure that prevents obstructive sleep apnea by keeping the throat from collapsing while you are sleeping. In most cases, the device is  smart  and can slowly self-adjusts if your throat collapses and keeps a record every day of how well you are treated-this information is available to you and your care team.  BPAP is bilevel positive airway pressure that keeps your throat open and also assists each breath with a pressure boost to maintain adequate breathing.  Special kinds of BPAP are used in patients who have inadequate breathing from lung or heart disease. In most cases, the device is  smart  and can slowly self-adjusts to assist breathing. Like CPAP, the device keeps a record of how well you are treated.  Your mask is your connection to the device. You get to choose what feels most comfortable and the staff will help to make sure if fits. Here: are some examples of the different masks that are available:       Key points to remember on your journey with sleep apnea:  1. Sleep study.  PAP devices often need to be adjusted during a sleep study to show that they are effective and adjusted right.  2. Good tips to remember: Try wearing just the mask during a quiet time during the day so your body adapts to wearing it. A humidifier is recommended for comfort in most cases to prevent drying of your nose and throat. Allergy medication from your provider may help you if you are having nasal congestion.  3. Getting settled-in. It takes more than one night for most of us to get used to wearing a mask. Try wearing just the mask during a quiet time during the day so your body adapts to wearing it. A humidifier is recommended for comfort in most cases. Our team will work  with you carefully on the first day and will be in contact within 4 days and again at 2 and 4 weeks for advice and remote device adjustments. Your therapy is evaluated by the device each day.   4. Use it every night. The more you are able to sleep naturally for 7-8 hours, the more likely you will have good sleep and to prevent health risks or symptoms from sleep apnea. Even if you use it 4 hours it helps. Occasionally all of us are unable to use a medical therapy, in sleep apnea, it is not dangerous to miss one night.   5. Communicate. Call our skilled team on the number provided on the first day if your visit for problems that make it difficult to wear the device. Over 2 out of 3 patients can learn to wear the device long-term with help from our team. Remember to call our team or your sleep providers if you are unable to wear the device as we may have other solutions for those who cannot adapt to mask CPAP therapy. It is recommended that you sleep your sleep provider within the first 3 months and yearly after that if you are not having problems.   6. Use it for your health. We encourage use of CPAP masks during daytime quiet periods to allow your face and brain to adapt to the sensation of CPAP so that it will be a more natural sensation to awaken to at night or during naps. This can be very useful during the first few weeks or months of adapting to CPAP though it does not help medically to wear CPAP during wakefulness and  should not be used as a strategy just to meet guidelines.  7. Take care of your equipment. Make sure you clean your mask and tubing using directions every day and that your filter and mask are replaced as recommended or if they are not working.     BESIDES CPAP, WHAT OTHER THERAPIES ARE THERE?    Positioning Device  Positioning devices are generally used when sleep apnea is mild and only occurs on your back.This example shows a pillow that straps around the waist. It may be appropriate for those  whose sleep study shows milder sleep apnea that occurs primarily when lying flat on one's back. Preliminary studies have shown benefit but effectiveness at home may need to be verified by a home sleep test. These devices are generally not covered by medical insurance.  Examples of devices that maintain sleeping on the back to prevent snoring and mild sleep apnea.    Belt type body positioner  Http://BrainLAB.Whiphand/    Electronic reminder  Http://nightshifttherapy.com/  Http://www.Cydcor.Whiphand.au/      Oral Appliance  What is oral appliance therapy?  An oral appliance device fits on your teeth at night like a retainer used after having braces. The device is made by a specialized dentist and requires several visits over 1-2 months before a manufactured device is made to fit your teeth and is adjusted to prevent your sleep apnea. Once an oral device is working properly, snoring should be improved. A home sleep test may be recommended at that time if to determine whether the sleep apnea is adequately treated.       Some things to remember:  -Oral devices are often, but not always, covered by your medical insurance. Be sure to check with your insurance provider.   -If you are referred for oral therapy, you will be given a list of specialized dentists to consider or you may choose to visit the Web site of the American Academy of Dental Sleep Medicine  -Oral devices are less likely to work if you have severe sleep apnea or are extremely overweight.     More detailed information  An oral appliance is a small acrylic device that fits over the upper and lower teeth  (similar to a retainer or a mouth guard). This device slightly moves jaw forward, which moves the base of the tongue forward, opens the airway, improves breathing for effective treat snoring and obstructive sleep apnea in perhaps 7 out of 10 people .  The best working devices are custom-made by a dental device  after a mold is made of the teeth 1, 2,  3.  When is an oral appliance indicated?  Oral appliance therapy is recommended as a first-line treatment for patients with primary snoring, mild sleep apnea, and for patients with moderate sleep apnea who prefer appliance therapy to use of CPAP4, 5. Severity of sleep apnea is determined by sleep testing and is based on the number of respiratory events per hour of sleep.   How successful is oral appliance therapy?  The success rate of oral appliance therapy in patients with mild sleep apnea is 75-80% while in patients with moderate sleep apnea it is 50-70%. The chance of success in patients with severe sleep apnea is 40-50%. The research also shows that oral appliances have a beneficial effect on the cardiovascular health of ANDREA patients at the same magnitude as CPAP therapy7.  Oral appliances should be a second-line treatment in cases of severe sleep apnea, but if not completely successful then a combination therapy utilizing CPAP plus oral appliance therapy may be effective. Oral appliances tend to be effective in a broad range of patients although studies show that the patients who have the highest success are females, younger patients, those with milder disease, and less severe obesity. 3, 6.   Finding a dentist that practices dental sleep medicine  Specific training is available through the American Academy of Dental Sleep Medicine for dentists interested in working in the field of sleep. To find a dentist who is educated in the field of sleep and the use of oral appliances, near you, visit the Web site of the American Academy of Dental Sleep Medicine.    References  1. Torsten et al. Objectively measured vs self-reported compliance during oral appliance therapy for sleep-disordered breathing. Chest 2013; 144(5): 9837-0657.  2. Huber et al. Objective measurement of compliance during oral appliance therapy for sleep-disordered breathing. Thorax 2013; 68(1): 91-96.  3. Mainor et al. Mandibular  advancement devices in 620 men and women with ANDREA and snoring: tolerability and predictors of treatment success. Chest 2004; 125: 7249-5040.  4. Fantasma et al. Oral appliances for snoring and ANDREA: a review. Sleep 2006; 29: 244-262.  5. Rachael et al. Oral appliance treatment for ANDREA: an update. J Clin Sleep Med 2014; 10(2): 215-227.  6. Harleen et al. Predictors of OSAH treatment outcome. J Dent Res 2007; 86: 5378-1947.      Weight Loss:    Weight loss is a long-term strategy that may improve sleep apnea in some patients.      Surgery:    Surgery for obstructive sleep apnea is considered generally only when other therapies fail to work. Surgery may be discussed with you if you are having a difficult time tolerating CPAP and or when there is an abnormal structure that requires surgical correction.  Nose and throat surgeries often enlarge the airway to prevent collapse.  Most of these surgeries create pain for 1-2 weeks and up to half of the most common surgeries are not effective throughout life.  You should carefully discuss the benefits and drawbacks to surgery with your sleep provider and surgeon to determine if it is the best solution for you.   More information  Surgery for ANDREA is directed at areas that are responsible for narrowing or complete obstruction of the airway during sleep.  There are a wide range of procedures available to enlarge and/or stabilize the airway to prevent blockage of breathing in the three major areas where it can occur: the palate, tongue, and nasal regions.  Successful surgical treatment depends on the accurate identification of the factors responsible for obstructive sleep apnea in each person.  A personalized approach is required because there is no single treatment that works well for everyone.  Because of anatomic variation, consultation with an examination by a sleep surgeon is a critical first step in determining what surgical options are best for each patient.  In some  cases, examination during sedation may be recommended in order to guide the selection of procedures.  Patients will be counseled about risks and benefits as well as the typical recovery course after surgery. Surgery is typically not a cure for a person s ANDREA.  However, surgery will often significantly improve one s ANDREA severity (termed  success rate ).  Even in the absence of a cure, surgery will decrease the cardiovascular risk associated with OSA7; improve overall quality of life8 (sleepiness, functionality, sleep quality, etc).      Palate Procedures:  Patients with ANDREA often have narrowing of their airway in the region of their tonsils and uvula.  The goals of palate procedures are to widen the airway in this region as well as to help the tissues resist collapse.  Modern palate procedure techniques focus on tissue conservation and soft tissue rearrangement, rather than tissue removal.  Often the uvula is preserved in this procedure. Residual sleep apnea is common in patient after pharyngoplasty with an average reduction in sleep apnea events of 33%2.      Tongue Procedures:  ExamWhile patients are awake, the muscles that surround the throat are active and keep this region open for breathing. These muscles relax during sleep, allowing the tongue and other structures to collapse and block breathing.  There are several different tongue procedures available.  Selection of a tongue base procedure depends on characteristics seen on physical exam.  Generally, procedures are aimed at removing bulky tissues in this area or preventing the back of the tongue from falling back during sleep.  Success rates for tongue surgery range from 50-62%3.    Hypoglossal Nerve Stimulation:  Hypoglossal nerve stimulation has recently received approval from the [place] Food and Drug Administration for the treatment of obstructive sleep apnea.  This is based on research showing that the system was safe and effective in treating sleep  apnea6.  Results showed that the median AHI score decreased 68%, from 29.3 to 9.0. This therapy uses an implant system that senses breathing patterns and delivers mild stimulation to airway muscles, which keeps the airway open during sleep.  The system consists of three fully implanted components: a small generator (similar in size to a pacemaker), a breathing sensor, and a stimulation lead.  Using a small handheld remote, a patient turns the therapy on before bed and off upon awakening.    Candidates for this device must be greater than 22 years of age, have moderate to severe ANDREA (AHI between 20-65), BMI less than 32, have tried CPAP/oral appliance without success, and have appropriate upper airway anatomy (determined by a sleep endoscopy performed by Dr. Longoria).    Hypoglossal Nerve Stimulation Pathway:    The sleep surgeon s office will work with the patient through the insurance prior-authorization process (including communications and appeals).    Nasal Procedures:  Nasal obstruction can interfere with nasal breathing during the day and night.  Studies have shown that relief of nasal obstruction can improve the ability of some patients to tolerate positive airway pressure therapy for obstructive sleep apnea1.  Treatment options include medications such as nasal saline, topical corticosteroid and antihistamine sprays, and oral medications such as antihistamines or decongestants. Non-surgical treatments can include external nasal dilators for selected patients. If these are not successful by themselves, surgery can improve the nasal airway either alone or in combination with these other options.      Combination Procedures:  Combination of surgical procedures and other treatments may be recommended, particularly if patients have more than one area of narrowing or persistent positional disease.  The success rate of combination surgery ranges from 66-80%2,3.    References  1. Julio Cesar PHILLIPS. The Role of the Nose in  Snoring and Obstructive Sleep Apnoea: An Update.  Eur Arch Otorhinolaryngol. 2011; 268: 1365-73.  2.  Bibiana SM; Cassidy JA; Jayshree JR; Pallanch JF; Adolfo MB; Jarvis SG; Anayeli PAL. Surgical modifications of the upper airway for obstructive sleep apnea in adults: a systematic review and meta-analysis. SLEEP 2010;33(10):3497-7953. [place] ZEPEDA. Hypopharyngeal surgery in obstructive sleep apnea: an evidence-based medicine review.  Arch Otolaryngol Head Neck Surg. 2006 Feb;132(2):206-13.  3. Radu YH1, Kaity Y, Carlitos MELCHOR. The efficacy of anatomically based multilevel surgery for obstructive sleep apnea. Otolaryngol Head Neck Surg. 2003 Oct;129(4):327-35.  4. Kezirian E, Goldberg A. Hypopharyngeal Surgery in Obstructive Sleep Apnea: An Evidence-Based Medicine Review. Arch Otolaryngol Head Neck Surg. 2006 Feb;132(2):206-13.  5. Emmie ROJAS et al. Upper-Airway Stimulation for Obstructive Sleep Apnea.  N Engl J Med. 2014 Jan 9;370(2):139-49.  6. Dav Y et al. Increased Incidence of Cardiovascular Disease in Middle-aged Men with Obstructive Sleep Apnea. Am J Respir Crit Care Med; 2002 166: 159-165  7. De La Fuente EM et al. Studying Life Effects and Effectiveness of Palatopharyngoplasty (SLEEP) study: Subjective Outcomes of Isolated Uvulopalatopharyngoplasty. Otolaryngol Head Neck Surg. 2011; 144: 623-631.            Your BMI is Body mass index is 22.96 kg/m .  Weight management is a personal decision.  If you are interested in exploring weight loss strategies, the following discussion covers the approaches that may be successful. Body mass index (BMI) is one way to tell whether you are at a healthy weight, overweight, or obese. It measures your weight in relation to your height.  A BMI of 18.5 to 24.9 is in the healthy range. A person with a BMI of 25 to 29.9 is considered overweight, and someone with a BMI of 30 or greater is considered obese. More than two-thirds of American adults are considered overweight or obese.  Being  overweight or obese increases the risk for further weight gain. Excess weight may lead to heart disease and diabetes.  Creating and following plans for healthy eating and physical activity may help you improve your health.  Weight control is part of healthy lifestyle and includes exercise, emotional health, and healthy eating habits. Careful eating habits lifelong are the mainstay of weight control. Though there are significant health benefits from weight loss, long-term weight loss with diet alone may be very difficult to achieve- studies show long-term success with dietary management in less than 10% of people. Attaining a healthy weight may be especially difficult to achieve in those with severe obesity. In some cases, medications, devices and surgical management might be considered.  What can you do?  If you are overweight or obese and are interested in methods for weight loss, you should discuss this with your provider.     Consider reducing daily calorie intake by 500 calories.     Keep a food journal.     Avoiding skipping meals, consider cutting portions instead.    Diet combined with exercise helps maintain muscle while optimizing fat loss. Strength training is particularly important for building and maintaining muscle mass. Exercise helps reduce stress, increase energy, and improves fitness. Increasing exercise without diet control, however, may not burn enough calories to loose weight.       Start walking three days a week 10-20 minutes at a time    Work towards walking thirty minutes five days a week     Eventually, increase the speed of your walking for 1-2 minutes at time    In addition, we recommend that you review healthy lifestyles and methods for weight loss available through the National Institutes of Health patient information sites:  http://win.niddk.nih.gov/publications/index.htm    And look into health and wellness programs that may be available through your health insurance provider,  employer, local community center, or j[employer].

## 2019-05-24 NOTE — PROGRESS NOTES
Chief complaint: Consultation requested by Jeff Walsh MD for the evaluation of possible obstructive sleep apnea    History of Present Illness: 54-year-old  who describes a history of snoring and observed apneas.  This is been going on for at least a few years.  The snoring and apneas tend to happen more when he is on his back.  He has woken himself up snoring on occasion.  He does also note that sleep can be unrefreshing.  He notes also that he may not be getting an adequate amount of sleep.  Typical bedtime is as late as 1 AM.  He usually gets up by 7 on work and school days.  If allowed to sleep and he did sleep until about 830.  He is not taking naps.  He is drinking 4 coffees a day or so.  His last may be after 3:30 PM.  He is not taking any prescribed or over-the-counter sleep aids.  He denies having problems dozing off during the day however he does think that his fatigue can impact his thinking.  He lives with his wife and son.  He does listen to radio podcast before bed and may fall asleep before its over.    He denies symptoms of restless legs or sleepwalking.  He has had nightmares and has been noted to scream from sleep every once in a while.  But no clear dream enactment behavior.  No problems with nasal breathing or nocturnal reflux.  His weight fluctuates within a few pounds.  He is originally from [facility].    Holly Seepiness Scale:1 (Less than 10 normal)    YUNG Total Score: 6    STOP-BANG  Loud Snore   1  Excessively Tired/Sleepy   0  Observed apnea   1  Hypertension   0  BMI> 35 kg/m2   0  Age >50   1  Neck >16 in/40cm   0  Male Gender   1  Total =   4  (0-2 low, 3-4 intermediate, 5-8 high risk of ANDREA)      Past Medical History:   Diagnosis Date     Diverticulosis      Hyperlipidemia 8/9/2011     Thoracic outlet syndrome     No symptoms     Uncomplicated asthma        No Known Allergies    Current Outpatient Medications   Medication     albuterol (PROAIR  HFA/PROVENTIL HFA/VENTOLIN HFA) 108 (90 Base) MCG/ACT inhaler     aspirin 81 MG tablet     atorvastatin (LIPITOR) 20 MG tablet     mometasone (ASMANEX) 110 MCG/INH inhaler     multivitamin, therapeutic (THERA-VIT) TABS tablet     No current facility-administered medications for this visit.        Social History     Socioeconomic History     Marital status:      Spouse name: Not on file     Number of children: 1     Years of education: Not on file     Highest education level: Not on file   Occupational History     Occupation: professor     Employer: [employer]   Social Needs     Financial resource strain: Not on file     Food insecurity:     Worry: Not on file     Inability: Not on file     Transportation needs:     Medical: Not on file     Non-medical: Not on file   Tobacco Use     Smoking status: Former Smoker     Types: Cigarettes     Last attempt to quit: 1997     Years since quittin.8     Smokeless tobacco: Never Used   Substance and Sexual Activity     Alcohol use: Yes     Alcohol/week: 1.8 oz     Types: 3 Glasses of wine per week     Frequency: 2-3 times a week     Drug use: No     Sexual activity: Yes     Partners: Female     Birth control/protection: Condom   Lifestyle     Physical activity:     Days per week: Not on file     Minutes per session: Not on file     Stress: Not on file   Relationships     Social connections:     Talks on phone: Not on file     Gets together: Not on file     Attends Zoroastrian service: Not on file     Active member of club or organization: Not on file     Attends meetings of clubs or organizations: Not on file     Relationship status: Not on file     Intimate partner violence:     Fear of current or ex partner: Not on file     Emotionally abused: Not on file     Physically abused: Not on file     Forced sexual activity: Not on file   Other Topics Concern     Parent/sibling w/ CABG, MI or angioplasty before 65F 55M? Not Asked   Social History Narrative  "   Professor in mechanical engineering.    1 child        Family History   Problem Relation Age of Onset     Asthma Mother      Colon Cancer Mother      Coronary Artery Disease Early Onset Father      Diabetes Father      Coronary Artery Disease Early Onset Paternal Grandmother      Asthma Son        Review of Systems: Positve for occasional dyspnea and wheezing, occasional hives, muscle pains, and per HPI, otherwise comprehensive review of systems is negative.    EXAM: Pleasant, alert, no distress  /74   Pulse 64   Resp 16   Ht 1.778 m (5' 10\")   Wt 72.6 kg (160 lb)   SpO2 98%   BMI 22.96 kg/m    HEENT: Normocephalic/atraumatic, pupils are equal, reactive to light, no scleral icterus  Oropharynx: Mallampati 2, tonsillar stage 1  Neck: supple no lymphadenopathy, neck circumference 15 inches  Chest: Clear to auscultation bilaterally, no rales or wheezes  Cardiac: Regular rate and rhythm, S1-S2 normal  Abdomen: Flat, soft and nontender, bowel sounds present  Extremities: Warm, well perfused, no cyanosis clubbing or edema  Psychiatric: Mood and affect appear normal  Neurologic: No gross focal deficits    ASSESSMENT: 54-year-old gentleman originally from [place] with some risk factors for obstructive sleep apnea including observed apnea and loud snoring.  Stop Bang 4 indicating intermediate risk.  Obstructive sleep apnea can be seen at lower BMI is in  populations.  He may be using caffeine as a compensatory measure for poor sleep quality.  However sleep deprivation also in differential.    PLAN: We reviewed the pathophysiology of obstructive sleep apnea and the importance of treating significant sleep apnea if he should have it.  We reviewed testing options including home sleep apnea testing and in lab polysomnography.  We reviewed treatment options including CPAP, oral appliances, position restriction.  Weight loss would likely not be effective.  We will proceed with home sleep apnea testing. "  If that testing is negative consider repeat testing in the sleep lab.  Also encouraged patient to try to increase sleep opportunity by half an hour nightly.  Please see patient instructions for further details of counseling provided.  He is agreeable with this plan.    Disha Carrillo M.D.  Pulmonary/Critical Care/Sleep Medicine    [facility]   Floor 1, [address]   [address] [place], [place] [address]. [place] [address]   Appointments: [phone]    The above note was dictated using voice recognition software and may include typographical errors. Please contact the author for any clarifications.

## 2019-05-29 ENCOUNTER — OFFICE VISIT (OUTPATIENT)
Dept: SLEEP MEDICINE | Facility: CLINIC | Age: 54
End: 2019-05-29
Payer: COMMERCIAL

## 2019-05-29 DIAGNOSIS — G47.30 OBSERVED SLEEP APNEA: ICD-10-CM

## 2019-05-29 DIAGNOSIS — R06.83 SNORING: ICD-10-CM

## 2019-05-29 PROCEDURE — G0399 HOME SLEEP TEST/TYPE 3 PORTA: HCPCS | Performed by: INTERNAL MEDICINE

## 2019-05-29 NOTE — PROGRESS NOTES
Pt is completing a home sleep test. Pt was instructed on how to put on the Noxturnal T3 device and associated equipment before going to bed and given the opportunity to practice putting it on before leaving the sleep center. Pt was reminded to bring the home sleep test kit back to the center tomorrow, at agreed upon time for download and reporting.     Miky Hernandez  Sleep Clinic Specialist - [place]

## 2019-05-30 ENCOUNTER — DOCUMENTATION ONLY (OUTPATIENT)
Dept: SLEEP MEDICINE | Facility: CLINIC | Age: 54
End: 2019-05-30
Payer: COMMERCIAL

## 2019-05-30 NOTE — PROCEDURES
"HOME SLEEP STUDY INTERPRETATION    Patient: Marcello Pastor  MRN: [id number]  YOB: 1965  Study Date: 5/29/2019  Referring Provider: Rosaline Mccray MD  Ordering Provider: Disha Carrillo MD     Indications for Home Study: Marcello Pastor is a 54 year old male with a history of asthma who presents with symptoms suggestive of obstructive sleep apnea.    Estimated body mass index is 22.96 kg/m  as calculated from the following:    Height as of 5/24/19: 1.778 m (5' 10\").    Weight as of 5/24/19: 72.6 kg (160 lb).  Total score - [place]: 1 (5/24/2019 11:00 AM)  StopBang Total Score: 4 (5/24/2019 11:00 AM)    Data: A full night home sleep study was performed recording the standard physiologic parameters including body position, movement, sound, nasal pressure, thermal oral airflow, chest and abdominal movements with respiratory inductance plethysmography, and oxygen saturation by pulse oximetry. Pulse rate was estimated by oximetry recording. This study was considered adequate based on > 4 hours of quality oximetry and respiratory recording. As specified by the AASM Manual for the Scoring of Sleep and Associated events, version 2.3, Rule VIII.D 1B, 4% oxygen desaturation scoring for hypopneas is used as a standard of care on all home sleep apnea testing.    Analysis Time:  363 minutes    Respiration:   Sleep Associated Hypoxemia: sustained hypoxemia was not present. Baseline oxygen saturation was 94%.  Time with saturation less than or equal to 88% was 0 minutes. The lowest oxygen saturation was 89%.   Snoring: Snoring was present, intermittant  Respiratory events: The home study revealed a presence of 26 obstructive apneas and 15 mixed and central apneas. There were 12 hypopneas resulting in a combined apnea/hypopnea index [AHI] of 8.7 events per hour.  AHI was 10.9 per hour supine, NA per hour prone, Na per hour on left side, and 0 per hour on right side.   Pattern: Excluding events noted above, respiratory " rate and pattern was Normal.    Position: Percent of time spent: supine - 80%, prone - 0%, on left - 0%, on right - 20%.    Heart Rate: By pulse oximetry normal rate was noted.     Assessment:   Mild obstructive sleep apnea.  Sleep associated hypoxemia was not present.    Recommendations:  Consider auto-CPAP at 5-15 cmH2O, oral appliance therapy or positional therapy.  Suggest optimizing sleep hygiene and avoiding sleep deprivation.  Weight management.    Diagnosis Code(s): Obstructive Sleep Apnea G47.33    Disha Carrillo MD, May 30, 2019   Diplomate, American Board of Internal Medicine, Sleep Medicine

## 2019-05-30 NOTE — PROGRESS NOTES
Pt returned HST device. It was downloaded and forwarded data to the clinical specialist for scoring.    Miky Hernandez  Sleep Clinic Specialist - [place]

## 2019-05-30 NOTE — PROGRESS NOTES
This HSAT was performed using a Noxturnal T3 device which recorded snore, sound, movement activity, body position, nasal pressure, oronasal thermal airflow, pulse, oximetry and both chest and abdominal respiratory effort. HSAT data was restricted to the time patient states they were in bed.     HSAT was scored using 1B 4% hypopnea rule.     AHI: 8.7. Snoring was reported as moderate.  Time with SpO2 below 89% was 0.0 minutes.   Overall signal quality was fair.    Pt will follow up with sleep provider to determine appropriate therapy.     Ordering Gerardo YAÑEZ Tereza, MD Charles O. BA, RPS, RST System Clinical Specialist 05/29/2019

## 2019-06-10 PROBLEM — G47.33 OSA (OBSTRUCTIVE SLEEP APNEA): Status: ACTIVE | Noted: 2019-06-10

## 2019-07-17 ENCOUNTER — TRANSFERRED RECORDS (OUTPATIENT)
Dept: HEALTH INFORMATION MANAGEMENT | Facility: CLINIC | Age: 54
End: 2019-07-17

## 2019-11-02 ENCOUNTER — HEALTH MAINTENANCE LETTER (OUTPATIENT)
Age: 54
End: 2019-11-02

## 2020-05-27 DIAGNOSIS — E78.5 HYPERLIPIDEMIA LDL GOAL <130: ICD-10-CM

## 2020-05-30 RX ORDER — ATORVASTATIN CALCIUM 20 MG/1
20 TABLET, FILM COATED ORAL DAILY
Qty: 90 TABLET | Refills: 0 | Status: SHIPPED | OUTPATIENT
Start: 2020-05-30 | End: 2021-04-01

## 2020-05-30 NOTE — TELEPHONE ENCOUNTER
atorvastatin (LIPITOR) 20 MG tablet  Last Written Prescription Date:  4/16/19  Last Fill Quantity: 90,   # refills: 3  Last Office Visit :4/29/19  Future Office visit:  None    Scheduling has been notified to contact the pt for appointment.    90 day to pharmacy    Routing refill request to provider for review/approval because:  LDL

## 2020-11-14 ENCOUNTER — HEALTH MAINTENANCE LETTER (OUTPATIENT)
Age: 55
End: 2020-11-14

## 2021-04-01 ENCOUNTER — VIRTUAL VISIT (OUTPATIENT)
Dept: FAMILY MEDICINE | Facility: CLINIC | Age: 56
End: 2021-04-01
Payer: COMMERCIAL

## 2021-04-01 DIAGNOSIS — J45.30 MILD PERSISTENT ASTHMA WITHOUT COMPLICATION: ICD-10-CM

## 2021-04-01 DIAGNOSIS — Z12.11 SPECIAL SCREENING FOR MALIGNANT NEOPLASMS, COLON: Primary | ICD-10-CM

## 2021-04-01 DIAGNOSIS — E78.5 HYPERLIPIDEMIA LDL GOAL <130: ICD-10-CM

## 2021-04-01 PROCEDURE — 99213 OFFICE O/P EST LOW 20 MIN: CPT | Mod: 95 | Performed by: NURSE PRACTITIONER

## 2021-04-01 RX ORDER — ATORVASTATIN CALCIUM 20 MG/1
20 TABLET, FILM COATED ORAL DAILY
Qty: 90 TABLET | Refills: 1 | Status: SHIPPED | OUTPATIENT
Start: 2021-04-01 | End: 2022-02-09

## 2021-04-01 RX ORDER — ALBUTEROL SULFATE 90 UG/1
2 AEROSOL, METERED RESPIRATORY (INHALATION) EVERY 6 HOURS PRN
Qty: 8.5 G | Refills: 0 | Status: SHIPPED | OUTPATIENT
Start: 2021-04-01 | End: 2023-04-15

## 2021-04-01 NOTE — PROGRESS NOTES
Marcello is a 56 year old who is being evaluated via a billable video visit.      How would you like to obtain your AVS? MyChart  If the video visit is dropped, the invitation should be resent by: Text to cell phone: cell  Will anyone else be joining your video visit? No      Video Start Time 3:15pm    Assessment & Plan     Hyperlipidemia LDL goal <130  Tolerating well. Due for labs  - atorvastatin (LIPITOR) 20 MG tablet; Take 1 tablet (20 mg) by mouth daily  - Lipid panel reflex to direct LDL Fasting; Future    Special screening for malignant neoplasms, colon  Due for screening  - GASTROENTEROLOGY ADULT REF PROCEDURE ONLY; Future    Mild persistent asthma without complication  Will send so he has on hand  - albuterol (PROAIR HFA) 108 (90 Base) MCG/ACT inhaler; Inhale 2 puffs into the lungs every 6 hours as needed for shortness of breath / dyspnea or wheezing  Dispense: 8.5 g; Refill: 0  - mometasone (ASMANEX, 30 METERED DOSES,) 110 MCG/INH inhaler; Inhale 1 puff into the lungs At Bedtime  Dispense: 1 each; Refill: 1      Return in about 6 months (around 10/1/2021) for Annual Exam.       TRICE Keenan, NP-C  [facility]   Marcello is a 56 year old who presents for the following health issues     HPI       No issues with asthma in the past year.  Also found mold issues in bathroom, and took care of it. Lots of times he found after showers he had more wheezing. That is much improved. Will send so he has refill on hand if he needs the medications    Needs lipitor refilled.   Due for colonoscopy    Review of Systems   Constitutional, HEENT, cardiovascular, pulmonary, gi and gu systems are negative, except as otherwise noted.      Objective           Vitals:  No vitals were obtained today due to virtual visit.    Physical Exam   GENERAL: Healthy, alert and no distress  EYES: Eyes grossly normal to inspection.  No discharge or erythema, or obvious scleral/conjunctival abnormalities.  RESP: No  audible wheeze, cough, or visible cyanosis.  No visible retractions or increased work of breathing.    SKIN: Visible skin clear. No significant rash, abnormal pigmentation or lesions.  NEURO: Cranial nerves grossly intact.  Mentation and speech appropriate for age.  PSYCH: Mentation appears normal, affect normal/bright, judgement and insight intact, normal speech and appearance well-groomed.    Results from 2019            Video-Visit Details    Type of service:  Video Visit    Video End Time:3:30 PM    Originating Location (pt. Location): Home    Distant Location (provider location):  Home secure location    Platform used for Video Visit: [employer]

## 2021-04-05 DIAGNOSIS — E78.5 HYPERLIPIDEMIA LDL GOAL <130: ICD-10-CM

## 2021-04-05 LAB
CHOLEST SERPL-MCNC: 165 MG/DL
HDLC SERPL-MCNC: 65 MG/DL
LDLC SERPL CALC-MCNC: 79 MG/DL
NONHDLC SERPL-MCNC: 100 MG/DL
TRIGL SERPL-MCNC: 105 MG/DL

## 2021-04-05 PROCEDURE — 36415 COLL VENOUS BLD VENIPUNCTURE: CPT | Performed by: NURSE PRACTITIONER

## 2021-04-05 PROCEDURE — 80061 LIPID PANEL: CPT | Performed by: NURSE PRACTITIONER

## 2021-04-06 NOTE — RESULT ENCOUNTER NOTE
Hi Marcello,  Your cholesterol is normal, we can repeat again in 1 year.  Please let me know if you have questions.  Thank you,  TRICE Keenan, NP-C  [facility]

## 2021-04-21 ENCOUNTER — OFFICE VISIT (OUTPATIENT)
Dept: INTERNAL MEDICINE | Facility: CLINIC | Age: 56
End: 2021-04-21
Payer: COMMERCIAL

## 2021-04-21 VITALS
HEART RATE: 82 BPM | BODY MASS INDEX: 22.68 KG/M2 | WEIGHT: 158.1 LBS | OXYGEN SATURATION: 98 % | DIASTOLIC BLOOD PRESSURE: 86 MMHG | SYSTOLIC BLOOD PRESSURE: 135 MMHG

## 2021-04-21 DIAGNOSIS — E78.5 HYPERLIPIDEMIA, UNSPECIFIED HYPERLIPIDEMIA TYPE: Primary | ICD-10-CM

## 2021-04-21 PROCEDURE — 99396 PREV VISIT EST AGE 40-64: CPT | Performed by: INTERNAL MEDICINE

## 2021-04-21 NOTE — PROGRESS NOTES
HPI  56-year-old  presents today for physical examination.  He has been doing reasonably well.  He has been socially isolating during the pandemic and has been limiting his physical activity.  Is not been doing much in the way of exercise.  He is eating healthy however and non-smoker.  No specific complaints or concerns today.  He did follow-up with the sleep clinic he has had diagnosis of sleep apnea he is referred for a dental appliance but he is not made these arrangements due to the pandemic.  We reinforced the importance for him to follow through with this.  Otherwise he has had no problems on his present medications.  He no longer needs the albuterol inhaler.  The recurrent bronchitis was aggravated by mold in his bathroom and now that he is isolated the mold he is no longer having the pulmonary symptoms.  Past Medical History:   Diagnosis Date     Diverticulosis      Hyperlipidemia 8/9/2011     ANDREA (obstructive sleep apnea) 6/10/2019     Thoracic outlet syndrome     No symptoms     Uncomplicated asthma      Past Surgical History:   Procedure Laterality Date     COLONOSCOPY  8/31/2011    Procedure:COLONOSCOPY; Surgeon:VU ELLISON; Location: GI     COLONOSCOPY  06/2016     Family History   Problem Relation Age of Onset     Asthma Mother      Colon Cancer Mother      Coronary Artery Disease Early Onset Father      Diabetes Father      Coronary Artery Disease Early Onset Paternal Grandmother      Asthma Son      Social History     Socioeconomic History     Marital status:      Spouse name: None     Number of children: 1     Years of education: None     Highest education level: None   Occupational History     Occupation: professor     Employer: [employer]   Social Needs     Financial resource strain: None     Food insecurity     Worry: None     Inability: None     Transportation needs     Medical: None     Non-medical: None   Tobacco Use     Smoking  status: Former Smoker     Types: Cigarettes     Quit date: 1997     Years since quittin.7     Smokeless tobacco: Never Used   Substance and Sexual Activity     Alcohol use: Yes     Alcohol/week: 3.0 standard drinks     Types: 3 Glasses of wine per week     Frequency: 2-3 times a week     Drug use: No     Sexual activity: Yes     Partners: Female     Birth control/protection: Condom   Lifestyle     Physical activity     Days per week: None     Minutes per session: None     Stress: None   Relationships     Social connections     Talks on phone: None     Gets together: None     Attends Sabianism service: None     Active member of club or organization: None     Attends meetings of clubs or organizations: None     Relationship status: None     Intimate partner violence     Fear of current or ex partner: None     Emotionally abused: None     Physically abused: None     Forced sexual activity: None   Other Topics Concern     Parent/sibling w/ CABG, MI or angioplasty before 65F 55M? 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    1 child      Answers for HPI/ROS submitted by the patient on 2021   General Symptoms: No  Skin Symptoms: No  HENT Symptoms: No  EYE SYMPTOMS: No  HEART SYMPTOMS: No  LUNG SYMPTOMS: No  INTESTINAL SYMPTOMS: No  URINARY SYMPTOMS: No  REPRODUCTIVE SYMPTOMS: No  SKELETAL SYMPTOMS: No  BLOOD SYMPTOMS: No  NERVOUS SYSTEM SYMPTOMS: No  MENTAL HEALTH SYMPTOMS: No    Exam:  /86   Pulse 82   Wt 71.7 kg (158 lb 1.6 oz)   SpO2 98%   BMI 22.68 kg/m    158 lbs 1.6 oz  Physical Exam   The patient is alert, oriented with a clear sensorium.   Skin shows no lesions or rashes and good turgor.   Head is normocephalic and atraumatic.   Eyes show PERRLA with benign optic fundi.   Ears show normal TMs bilaterally.   Mouth shows clear oral mucosa.   Neck shows no nodes, thyromegaly or bruits.   Back is non tender.  Lungs are clear to percussion and  auscultation.   Heart shows normal S1 and S2 without murmur or gallop.   Abdomen is soft and nontender without masses or organomegaly.   Genitalia show normal testes. No evidence of inguinal hernia.  Rectal shows small smooth prostate without nodules or masses.  Extremities show no edema and no evidence of active synovitis.   Neurologic examination shows cranial nerves II-XII intact. Motor shows 5/5 strength. Reflexes are symmetric. Cerebellar testing shows normal tandem gait.  Romberg negative.    Lipids reviewed:  Results for LITZY HOWARD (MRN [id number]) as of 4/21/2021 18:19   Ref. Range 4/5/2021 13:53   Cholesterol Latest Ref Range: <200 mg/dL 165   HDL Cholesterol Latest Ref Range: >39 mg/dL 65   LDL Cholesterol Calculated Latest Ref Range: <100 mg/dL 79   Non HDL Cholesterol Latest Ref Range: <130 mg/dL 100   Triglycerides Latest Ref Range: <150 mg/dL 105     ASSESSMENT  1 Sleep apnea  2 recurrent bronchitis secondary mold resolved   3 hyperlipidemia on atorvastatin  4 History colon polyps, colonoscopy due 2021    Plan  We discussed diet and exercise.  Needs to schedule a colonoscopy this year and he is contacted them regarding this.  We will continue the atorvastatin.  This note was completed using Dragon voice recognition software.      Jeff Walsh MD  General Internal Medicine  [facility]  [phone]

## 2021-04-21 NOTE — NURSING NOTE
Chief Complaint   Patient presents with     Physical     annual physical       CASTILLO Malagon at 4:31 PM on 4/21/2021

## 2021-06-11 ENCOUNTER — TELEPHONE (OUTPATIENT)
Dept: GASTROENTEROLOGY | Facility: CLINIC | Age: 56
End: 2021-06-11

## 2021-06-11 DIAGNOSIS — Z11.59 ENCOUNTER FOR SCREENING FOR OTHER VIRAL DISEASES: ICD-10-CM

## 2021-06-11 NOTE — TELEPHONE ENCOUNTER
Screening Questions  1. What insurance is in the chart? On chart    2.  Ordering/Referring Provider: bunt    3. BMI 23.6    4. Are you on daily home oxygen? no    5. Do you have a history of difficult airway? no    6. Have you had a heart, lung, or liver transplant?  no    7. Are you currently on dialysis? no    8. Have you had a stroke or Transient ischemic atttack (TIA) within 6 months? no    9. In the past 6 months, have you had any heart related issues including cardiomyopathy or heart attack?         If yes, did it require cardiac stenting or other implantable device?no    10. Do you have any implantable devices in your body (pacemaker, defib, LVAD)? no    11. Do you take nitroglycerin? If yes, how often? no    12. Are you currently taking any blood thinners?no    13. Are you a diabetic?   no    14. (Females) Are you currently pregnant?   If yes, how many weeks?    15. Have you had a procedure in the past that was difficult to tolerate with conscious sedation? Any allergies to Fentanyl or Versed no    16. Are you taking any scheduled prescription narcotics more than once daily? no    17. Do you have any chemical dependencies such as alcohol, street drugs, or methadone? no    18. Do you have any history of post-traumatic stress syndrome or mental health issues? no    19. Do you transfer independently? yes    20.  Do you have any issues with constipation? no    21. Preferred Pharmacy for Pre Prescription  On chart    Scheduling Details    Procedure Scheduled: Colon  Provider/Surgeon: Rosina  Date of Procedure: 6/18/2021  Location: [facility]  Caller (Please ask for phone number if not scheduled by patient): Merced Pastor      Sedation Type: MAC  Conscious Sedation- Needs  for 6 hours after the procedure  MAC/General-Needs  for 24 hours after procedure    Pre-op Required at [facility] and OR for  MAC sedation:   (if yes advise patient they will need a pre-op prior to procedure)      Is patient on blood thinners?  -no (If yes- inform patient to follow up with PCP or provider for follow up instructions)     Informed patient they will need an adult  yes  Cannot take any type of public or medical transportation alone    Informed Patient of COVID Test Requirement yes    Confirmed Nurse will call to complete assessment yes    Additional comments: no

## 2021-06-14 ENCOUNTER — TELEPHONE (OUTPATIENT)
Dept: GASTROENTEROLOGY | Facility: OUTPATIENT CENTER | Age: 56
End: 2021-06-14

## 2021-06-15 DIAGNOSIS — Z11.59 ENCOUNTER FOR SCREENING FOR OTHER VIRAL DISEASES: ICD-10-CM

## 2021-06-15 LAB
SARS-COV-2 RNA RESP QL NAA+PROBE: NORMAL
SPECIMEN SOURCE: NORMAL

## 2021-06-15 PROCEDURE — U0003 INFECTIOUS AGENT DETECTION BY NUCLEIC ACID (DNA OR RNA); SEVERE ACUTE RESPIRATORY SYNDROME CORONAVIRUS 2 (SARS-COV-2) (CORONAVIRUS DISEASE [COVID-19]), AMPLIFIED PROBE TECHNIQUE, MAKING USE OF HIGH THROUGHPUT TECHNOLOGIES AS DESCRIBED BY CMS-2020-01-R: HCPCS | Performed by: INTERNAL MEDICINE

## 2021-06-15 PROCEDURE — U0005 INFEC AGEN DETEC AMPLI PROBE: HCPCS | Performed by: INTERNAL MEDICINE

## 2021-06-16 ENCOUNTER — TELEPHONE (OUTPATIENT)
Dept: GASTROENTEROLOGY | Facility: OUTPATIENT CENTER | Age: 56
End: 2021-06-16

## 2021-06-16 LAB
LABORATORY COMMENT REPORT: NORMAL
SARS-COV-2 RNA RESP QL NAA+PROBE: NEGATIVE
SPECIMEN SOURCE: NORMAL

## 2021-06-16 NOTE — TELEPHONE ENCOUNTER
"Is patient taking blood thinners/antiplatelet medications?  No    Heart Disease? denies    Lung Disease? denies    Sleep Apnea? No Cpap    Diabetic? denies    Kidney Disease? denies    Electronic Implantable Devices? denies    PTSD? N/a    Prep instructions reviewed with patient? Instructions briefly reviewed. Patient states his wife is a \" GI doc\" so declined further review..  policy, MAC sedation plan reviewed. Instructed patient to have someone stay with him for 24 hours post exam    : Yes    Pharmacy: N/a    Indication for Procedure: Screening colonoscopy    Referring Provider:Billie Dumont NP     Arrival Time: 9 AM    COVID test? 6-15 [facility]    Vi Coyle RN  "

## 2021-06-18 ENCOUNTER — TRANSFERRED RECORDS (OUTPATIENT)
Dept: HEALTH INFORMATION MANAGEMENT | Facility: CLINIC | Age: 56
End: 2021-06-18

## 2021-06-18 ENCOUNTER — DOCUMENTATION ONLY (OUTPATIENT)
Dept: GASTROENTEROLOGY | Facility: OUTPATIENT CENTER | Age: 56
End: 2021-06-18
Payer: COMMERCIAL

## 2021-06-18 DIAGNOSIS — Z12.11 SPECIAL SCREENING FOR MALIGNANT NEOPLASMS, COLON: ICD-10-CM

## 2021-09-12 ENCOUNTER — HEALTH MAINTENANCE LETTER (OUTPATIENT)
Age: 56
End: 2021-09-12

## 2022-02-09 ENCOUNTER — OFFICE VISIT (OUTPATIENT)
Dept: INTERNAL MEDICINE | Facility: CLINIC | Age: 57
End: 2022-02-09
Payer: COMMERCIAL

## 2022-02-09 VITALS
DIASTOLIC BLOOD PRESSURE: 78 MMHG | WEIGHT: 166 LBS | OXYGEN SATURATION: 97 % | HEART RATE: 101 BPM | BODY MASS INDEX: 23.82 KG/M2 | SYSTOLIC BLOOD PRESSURE: 114 MMHG

## 2022-02-09 DIAGNOSIS — E78.5 HYPERLIPIDEMIA LDL GOAL <130: ICD-10-CM

## 2022-02-09 DIAGNOSIS — T14.8XXA BRUISING: Primary | ICD-10-CM

## 2022-02-09 PROCEDURE — 99214 OFFICE O/P EST MOD 30 MIN: CPT | Performed by: INTERNAL MEDICINE

## 2022-02-09 RX ORDER — ATORVASTATIN CALCIUM 20 MG/1
20 TABLET, FILM COATED ORAL DAILY
Qty: 90 TABLET | Refills: 3 | Status: SHIPPED | OUTPATIENT
Start: 2022-02-09 | End: 2023-03-21

## 2022-02-09 ASSESSMENT — PAIN SCALES - GENERAL: PAINLEVEL: NO PAIN (0)

## 2022-02-09 ASSESSMENT — ASTHMA QUESTIONNAIRES: ACT_TOTALSCORE: 23

## 2022-02-09 NOTE — NURSING NOTE
Chief Complaint   Patient presents with     Bleeding/Bruising     unkown source left upper arm has had it appear in the past      Mass     lump on back and neck growing     Musculoskeletal Problem     still having numbness to right arm from thorasic outlet syndrome     Sleep Problem     discuss apnea       Manuela Lechuga, EMT at 2:52 PM on 2/9/2022

## 2022-02-09 NOTE — PROGRESS NOTES
HPI  56-year-old  presents today for several concerns. He has had a lipoma on the back of his neck which is increased in size according to his wife who cuts his hair. He is unaware of it is not causing him any pain or discomfort. He has not noticed it but it has apparently increased in size and migrated somewhat inferiorly. He is also noted 3 episodes of ecchymosis. These always occur in the left distal bicep area. Its not related to exertion activity or exercise. He is not doing any weight training he is not had any injury or trauma to this area. It tends to happen spontaneously. He has no other ecchymosis or bleeding elsewhere. No epistaxis bleeding gums hematochezia or hematuria. He has a history of sleep apnea was prescribed a dental appliance but has yet to follow through with this and we encouraged him to follow-up with the dental school regarding this. He is taking prophylactic aspirin. We discussed the limited utility of this in addition to the atorvastatin he is also taking. In light of his bruising recommended that he lay off the aspirin but continue with the atorvastatin and follow-up with fasting lipids. He does have a history of thoracic outlet syndrome with intermittent numbness in the right arm. This been more problematic recently he has not been doing his exercises.  Past Medical History:   Diagnosis Date     Diverticulosis      Hyperlipidemia 8/9/2011     ANDREA (obstructive sleep apnea) 6/10/2019     Thoracic outlet syndrome     No symptoms     Uncomplicated asthma      Past Surgical History:   Procedure Laterality Date     COLONOSCOPY  8/31/2011    Procedure:COLONOSCOPY; Surgeon:VU ELLISON; Location: [facility]     COLONOSCOPY  06/2016     Family History   Problem Relation Age of Onset     Asthma Mother      Colon Cancer Mother      Coronary Artery Disease Early Onset Father      Diabetes Father      Coronary Artery Disease Early Onset Paternal Grandmother       Asthma Son          Exam:  /78 (BP Location: Right arm, Patient Position: Sitting, Cuff Size: Adult Regular)   Pulse 101   Wt 75.3 kg (166 lb)   SpO2 97%   BMI 23.82 kg/m    166 lbs 0 oz  The patient is alert, oriented with a clear sensorium.   Skin shows no lesions or rashes and good turgor.   Head is normocephalic and atraumatic.    Neck shows no nodes, thyromegaly. Large lipoma at the base of his neck soft and movable     Lungs are clear.   Heart shows normal S1 and S2 without murmur or gallop.    Extremities show no edema. No tenderness or deformity in the area of the left bicep. Muscle bulk is normal. His speeds and Yergason's test are negative. CMS is intact distally. He does have positive Adson's test        ASSESSMENT  1 Lipoma  2 Sleep apnea needs dental appliance  3 hyperlipidemia on atorvastatin  4 thoracic outlet syndrome  5 bruising likely related to aspirin and minor trauma  6 history colon polyps with family history colon cancer  colonoscopy due 2026     Plan  We will have him hold off on the aspirin. We will check his CBC coags and lipids fasting. We will continue the atorvastatin. Offered surgical referral for the lipoma but he declined. He will pursue this if it bothers him at all. Encouraged him to resume doing his physical therapy exercises for the thoracic outlet.  This note was completed using Dragon voice recognition software.      Jeff Walsh MD  General Internal Medicine  [facility]  [phone]

## 2022-02-16 ENCOUNTER — LAB (OUTPATIENT)
Dept: LAB | Facility: CLINIC | Age: 57
End: 2022-02-16
Payer: COMMERCIAL

## 2022-02-16 DIAGNOSIS — T14.8XXA BRUISING: ICD-10-CM

## 2022-02-16 DIAGNOSIS — E78.5 HYPERLIPIDEMIA LDL GOAL <130: ICD-10-CM

## 2022-02-16 LAB
APTT PPP: 31 SECONDS (ref 22–38)
BASOPHILS # BLD AUTO: 0 10E3/UL (ref 0–0.2)
BASOPHILS NFR BLD AUTO: 1 %
CHOLEST SERPL-MCNC: 195 MG/DL
EOSINOPHIL # BLD AUTO: 0.1 10E3/UL (ref 0–0.7)
EOSINOPHIL NFR BLD AUTO: 2 %
ERYTHROCYTE [DISTWIDTH] IN BLOOD BY AUTOMATED COUNT: 13.2 % (ref 10–15)
FASTING STATUS PATIENT QL REPORTED: YES
HCT VFR BLD AUTO: 46.1 % (ref 40–53)
HDLC SERPL-MCNC: 69 MG/DL
HGB BLD-MCNC: 14.9 G/DL (ref 13.3–17.7)
IMM GRANULOCYTES # BLD: 0 10E3/UL
IMM GRANULOCYTES NFR BLD: 0 %
INR PPP: 0.99 (ref 0.85–1.15)
LDLC SERPL CALC-MCNC: 106 MG/DL
LYMPHOCYTES # BLD AUTO: 1.5 10E3/UL (ref 0.8–5.3)
LYMPHOCYTES NFR BLD AUTO: 37 %
MCH RBC QN AUTO: 29 PG (ref 26.5–33)
MCHC RBC AUTO-ENTMCNC: 32.3 G/DL (ref 31.5–36.5)
MCV RBC AUTO: 90 FL (ref 78–100)
MONOCYTES # BLD AUTO: 0.4 10E3/UL (ref 0–1.3)
MONOCYTES NFR BLD AUTO: 9 %
NEUTROPHILS # BLD AUTO: 2.1 10E3/UL (ref 1.6–8.3)
NEUTROPHILS NFR BLD AUTO: 51 %
NONHDLC SERPL-MCNC: 126 MG/DL
NRBC # BLD AUTO: 0 10E3/UL
NRBC BLD AUTO-RTO: 0 /100
PLATELET # BLD AUTO: 209 10E3/UL (ref 150–450)
RBC # BLD AUTO: 5.13 10E6/UL (ref 4.4–5.9)
TRIGL SERPL-MCNC: 102 MG/DL
WBC # BLD AUTO: 4.1 10E3/UL (ref 4–11)

## 2022-02-16 PROCEDURE — 36415 COLL VENOUS BLD VENIPUNCTURE: CPT | Performed by: PATHOLOGY

## 2022-02-16 PROCEDURE — 85025 COMPLETE CBC W/AUTO DIFF WBC: CPT | Performed by: PATHOLOGY

## 2022-02-16 PROCEDURE — 85610 PROTHROMBIN TIME: CPT | Performed by: PATHOLOGY

## 2022-02-16 PROCEDURE — 80061 LIPID PANEL: CPT | Performed by: PATHOLOGY

## 2022-02-16 PROCEDURE — 85730 THROMBOPLASTIN TIME PARTIAL: CPT | Performed by: PATHOLOGY

## 2022-06-19 ENCOUNTER — HEALTH MAINTENANCE LETTER (OUTPATIENT)
Age: 57
End: 2022-06-19

## 2022-11-19 ENCOUNTER — HEALTH MAINTENANCE LETTER (OUTPATIENT)
Age: 57
End: 2022-11-19

## 2023-03-18 DIAGNOSIS — E78.5 HYPERLIPIDEMIA LDL GOAL <130: ICD-10-CM

## 2023-03-21 RX ORDER — ATORVASTATIN CALCIUM 20 MG/1
20 TABLET, FILM COATED ORAL DAILY
Qty: 90 TABLET | Refills: 0 | Status: SHIPPED | OUTPATIENT
Start: 2023-03-21 | End: 2023-04-15

## 2023-03-21 NOTE — TELEPHONE ENCOUNTER
ATORVASTATIN CALCIUM 20MG TABS      Last Written Prescription Date:  2/9/22  Last Fill Quantity: 90,   # refills: 3  Last Office Visit : 2/9/22  Future Office visit: none     Routing refill request to provider for review/approval because:  Overdue for 12mo office visit   Overdue for lab: LDL

## 2023-04-14 ASSESSMENT — ENCOUNTER SYMPTOMS
DYSURIA: 0
FLANK PAIN: 0
HEMATURIA: 0
DIFFICULTY URINATING: 0

## 2023-04-15 ENCOUNTER — MYC MEDICAL ADVICE (OUTPATIENT)
Dept: INTERNAL MEDICINE | Facility: CLINIC | Age: 58
End: 2023-04-15

## 2023-04-15 ENCOUNTER — LAB (OUTPATIENT)
Dept: LAB | Facility: CLINIC | Age: 58
End: 2023-04-15
Payer: COMMERCIAL

## 2023-04-15 ENCOUNTER — OFFICE VISIT (OUTPATIENT)
Dept: INTERNAL MEDICINE | Facility: CLINIC | Age: 58
End: 2023-04-15
Payer: COMMERCIAL

## 2023-04-15 VITALS
WEIGHT: 163.7 LBS | SYSTOLIC BLOOD PRESSURE: 116 MMHG | HEIGHT: 69 IN | DIASTOLIC BLOOD PRESSURE: 72 MMHG | HEART RATE: 69 BPM | OXYGEN SATURATION: 100 % | BODY MASS INDEX: 24.24 KG/M2

## 2023-04-15 DIAGNOSIS — G47.33 OSA (OBSTRUCTIVE SLEEP APNEA): ICD-10-CM

## 2023-04-15 DIAGNOSIS — E78.5 HYPERLIPIDEMIA, UNSPECIFIED HYPERLIPIDEMIA TYPE: Primary | ICD-10-CM

## 2023-04-15 DIAGNOSIS — E78.5 HYPERLIPIDEMIA, UNSPECIFIED HYPERLIPIDEMIA TYPE: ICD-10-CM

## 2023-04-15 DIAGNOSIS — R73.09 ELEVATED GLUCOSE: Primary | ICD-10-CM

## 2023-04-15 DIAGNOSIS — R73.09 ELEVATED GLUCOSE: ICD-10-CM

## 2023-04-15 DIAGNOSIS — E78.5 HYPERLIPIDEMIA LDL GOAL <130: ICD-10-CM

## 2023-04-15 DIAGNOSIS — H53.9 VISION CHANGES: ICD-10-CM

## 2023-04-15 LAB
ALBUMIN SERPL BCG-MCNC: 4.2 G/DL (ref 3.5–5.2)
ALP SERPL-CCNC: 41 U/L (ref 40–129)
ALT SERPL W P-5'-P-CCNC: 35 U/L (ref 10–50)
ANION GAP SERPL CALCULATED.3IONS-SCNC: 8 MMOL/L (ref 7–15)
AST SERPL W P-5'-P-CCNC: 29 U/L (ref 10–50)
BASOPHILS # BLD AUTO: 0.1 10E3/UL (ref 0–0.2)
BASOPHILS NFR BLD AUTO: 1 %
BILIRUB SERPL-MCNC: 1 MG/DL
BUN SERPL-MCNC: 16.5 MG/DL (ref 6–20)
CALCIUM SERPL-MCNC: 9.3 MG/DL (ref 8.6–10)
CHLORIDE SERPL-SCNC: 104 MMOL/L (ref 98–107)
CHOLEST SERPL-MCNC: 173 MG/DL
CREAT SERPL-MCNC: 0.92 MG/DL (ref 0.67–1.17)
DEPRECATED HCO3 PLAS-SCNC: 28 MMOL/L (ref 22–29)
EOSINOPHIL # BLD AUTO: 0.1 10E3/UL (ref 0–0.7)
EOSINOPHIL NFR BLD AUTO: 3 %
ERYTHROCYTE [DISTWIDTH] IN BLOOD BY AUTOMATED COUNT: 13.4 % (ref 10–15)
GFR SERPL CREATININE-BSD FRML MDRD: >90 ML/MIN/1.73M2
GLUCOSE SERPL-MCNC: 102 MG/DL (ref 70–99)
HCT VFR BLD AUTO: 43.8 % (ref 40–53)
HDLC SERPL-MCNC: 67 MG/DL
HGB BLD-MCNC: 14.4 G/DL (ref 13.3–17.7)
IMM GRANULOCYTES # BLD: 0 10E3/UL
IMM GRANULOCYTES NFR BLD: 1 %
LDLC SERPL CALC-MCNC: 88 MG/DL
LYMPHOCYTES # BLD AUTO: 1.9 10E3/UL (ref 0.8–5.3)
LYMPHOCYTES NFR BLD AUTO: 44 %
MCH RBC QN AUTO: 29.4 PG (ref 26.5–33)
MCHC RBC AUTO-ENTMCNC: 32.9 G/DL (ref 31.5–36.5)
MCV RBC AUTO: 89 FL (ref 78–100)
MONOCYTES # BLD AUTO: 0.4 10E3/UL (ref 0–1.3)
MONOCYTES NFR BLD AUTO: 9 %
NEUTROPHILS # BLD AUTO: 1.8 10E3/UL (ref 1.6–8.3)
NEUTROPHILS NFR BLD AUTO: 42 %
NONHDLC SERPL-MCNC: 106 MG/DL
NRBC # BLD AUTO: 0 10E3/UL
NRBC BLD AUTO-RTO: 0 /100
PLATELET # BLD AUTO: 197 10E3/UL (ref 150–450)
POTASSIUM SERPL-SCNC: 4.6 MMOL/L (ref 3.4–5.3)
PROT SERPL-MCNC: 7 G/DL (ref 6.4–8.3)
RBC # BLD AUTO: 4.9 10E6/UL (ref 4.4–5.9)
SODIUM SERPL-SCNC: 140 MMOL/L (ref 136–145)
TRIGL SERPL-MCNC: 91 MG/DL
WBC # BLD AUTO: 4.3 10E3/UL (ref 4–11)

## 2023-04-15 PROCEDURE — 80061 LIPID PANEL: CPT | Performed by: PATHOLOGY

## 2023-04-15 PROCEDURE — 99000 SPECIMEN HANDLING OFFICE-LAB: CPT | Performed by: PATHOLOGY

## 2023-04-15 PROCEDURE — 90715 TDAP VACCINE 7 YRS/> IM: CPT | Performed by: INTERNAL MEDICINE

## 2023-04-15 PROCEDURE — 36415 COLL VENOUS BLD VENIPUNCTURE: CPT | Performed by: PATHOLOGY

## 2023-04-15 PROCEDURE — 80053 COMPREHEN METABOLIC PANEL: CPT | Performed by: PATHOLOGY

## 2023-04-15 PROCEDURE — 90471 IMMUNIZATION ADMIN: CPT | Performed by: INTERNAL MEDICINE

## 2023-04-15 PROCEDURE — 85025 COMPLETE CBC W/AUTO DIFF WBC: CPT | Performed by: PATHOLOGY

## 2023-04-15 PROCEDURE — 99396 PREV VISIT EST AGE 40-64: CPT | Mod: 25 | Performed by: INTERNAL MEDICINE

## 2023-04-15 PROCEDURE — 83036 HEMOGLOBIN GLYCOSYLATED A1C: CPT | Mod: 90 | Performed by: PATHOLOGY

## 2023-04-15 RX ORDER — ATORVASTATIN CALCIUM 20 MG/1
20 TABLET, FILM COATED ORAL DAILY
Qty: 90 TABLET | Refills: 3 | Status: SHIPPED | OUTPATIENT
Start: 2023-04-15 | End: 2024-02-24

## 2023-04-15 ASSESSMENT — PAIN SCALES - GENERAL: PAINLEVEL: NO PAIN (0)

## 2023-04-15 NOTE — NURSING NOTE
Chief Complaint   Patient presents with     Physical     Here for annual visit.        Ana Roach CMA, EMT at 10:34 AM on 4/15/2023.

## 2023-04-15 NOTE — PROGRESS NOTES
HPI  58-year-old  presents today for physical examination and refill on his atorvastatin.  He is tolerated this well he said no side effects or ill effects related to this.  He is back exercising working out on the elliptical 3 days a week.  His diet is reasonably healthy his sleep is somewhat fragmented he has a history of sleep apnea which remains untreated.  He never got the dental appliance that he was planning on.  We discussed a return to the sleep clinic for more definitive treatment of this.  Otherwise uses no tobacco minimal alcohol has no complaints or problems today  Past Medical History:   Diagnosis Date     Diverticulosis      Hyperlipidemia 8/9/2011     ANDREA (obstructive sleep apnea) 6/10/2019     Thoracic outlet syndrome     No symptoms     Uncomplicated asthma      Past Surgical History:   Procedure Laterality Date     COLONOSCOPY  8/31/2011    Procedure:COLONOSCOPY; Surgeon:VU ELLISON; Location: [facility]     COLONOSCOPY  06/2016     Family History   Problem Relation Age of Onset     Asthma Mother      Colon Cancer Mother      Coronary Artery Disease Early Onset Father      Diabetes Father      Coronary Artery Disease Early Onset Paternal Grandmother      Asthma Son      Answers for HPI/ROS submitted by the patient on 4/14/2023  General Symptoms: No  Skin Symptoms: No  HENT Symptoms: No  EYE SYMPTOMS: No  HEART SYMPTOMS: No  LUNG SYMPTOMS: No  INTESTINAL SYMPTOMS: No  URINARY SYMPTOMS: Yes  REPRODUCTIVE SYMPTOMS: No  SKELETAL SYMPTOMS: No  BLOOD SYMPTOMS: No  NERVOUS SYSTEM SYMPTOMS: No  MENTAL HEALTH SYMPTOMS: No  Trouble holding urine or incontinence: No  Pain or burning: No  Trouble starting or stopping: No  Increased frequency of urination: No  Blood in urine: No  Decreased frequency of urination: No  Frequent nighttime urination: No  Flank pain: No  Difficulty emptying bladder: No        Exam:  /72 (BP Location: Right arm, Patient Position: Sitting, Cuff Size: Adult  "Regular)   Pulse 69   Ht 1.753 m (5' 9\")   Wt 74.3 kg (163 lb 11.2 oz)   SpO2 100%   BMI 24.17 kg/m    163 lbs 11.2 oz  Physical Exam   The patient is alert, oriented with a clear sensorium.   Skin shows no lesions or rashes and good turgor.   Head is normocephalic and atraumatic.   Eyes show PERRLA with benign optic fundi.   Ears show cerumen bilaterally.   Mouth shows clear oral mucosa.   Neck shows no nodes, thyromegaly or bruits.   Back is non tender.  Lungs are clear to percussion and auscultation.   Heart shows normal S1 and S2 without murmur or gallop.   Abdomen is soft and nontender without masses or organomegaly.   Genitalia show normal testes. No evidence of inguinal hernia.  Rectal shows small smooth prostate without nodules or masses.  Extremities show no edema and no evidence of active synovitis.   Neurologic examination shows cranial nerves II-XII intact. Motor shows 5/5 strength. Reflexes are symmetric. Cerebellar testing shows normal tandem gait.  Romberg negative.    Labs reviewed:  Results for orders placed or performed in visit on 04/15/23   Comprehensive metabolic panel     Status: Abnormal   Result Value Ref Range    Sodium 140 136 - 145 mmol/L    Potassium 4.6 3.4 - 5.3 mmol/L    Chloride 104 98 - 107 mmol/L    Carbon Dioxide (CO2) 28 22 - 29 mmol/L    Anion Gap 8 7 - 15 mmol/L    Urea Nitrogen 16.5 6.0 - 20.0 mg/dL    Creatinine 0.92 0.67 - 1.17 mg/dL    Calcium 9.3 8.6 - 10.0 mg/dL    Glucose 102 (H) 70 - 99 mg/dL    Alkaline Phosphatase 41 40 - 129 U/L    AST 29 10 - 50 U/L    ALT 35 10 - 50 U/L    Protein Total 7.0 6.4 - 8.3 g/dL    Albumin 4.2 3.5 - 5.2 g/dL    Bilirubin Total 1.0 <=1.2 mg/dL    GFR Estimate >90 >60 mL/min/1.73m2   Lipid Profile     Status: Normal   Result Value Ref Range    Cholesterol 173 <200 mg/dL    Triglycerides 91 <150 mg/dL    Direct Measure HDL 67 >=40 mg/dL    LDL Cholesterol Calculated 88 <=100 mg/dL    Non HDL Cholesterol 106 <130 mg/dL    Narrative    " Cholesterol  Desirable:  <200 mg/dL    Triglycerides  Normal:  Less than 150 mg/dL  Borderline High:  150-199 mg/dL  High:  200-499 mg/dL  Very High:  Greater than or equal to 500 mg/dL    Direct Measure HDL  Female:  Greater than or equal to 50 mg/dL   Male:  Greater than or equal to 40 mg/dL    LDL Cholesterol  Desirable:  <100mg/dL  Above Desirable:  100-129 mg/dL   Borderline High:  130-159 mg/dL   High:  160-189 mg/dL   Very High:  >= 190 mg/dL    Non HDL Cholesterol  Desirable:  130 mg/dL  Above Desirable:  130-159 mg/dL  Borderline High:  160-189 mg/dL  High:  190-219 mg/dL  Very High:  Greater than or equal to 220 mg/dL   CBC with platelets and differential     Status: None   Result Value Ref Range    WBC Count 4.3 4.0 - 11.0 10e3/uL    RBC Count 4.90 4.40 - 5.90 10e6/uL    Hemoglobin 14.4 13.3 - 17.7 g/dL    Hematocrit 43.8 40.0 - 53.0 %    MCV 89 78 - 100 fL    MCH 29.4 26.5 - 33.0 pg    MCHC 32.9 31.5 - 36.5 g/dL    RDW 13.4 10.0 - 15.0 %    Platelet Count 197 150 - 450 10e3/uL    % Neutrophils 42 %    % Lymphocytes 44 %    % Monocytes 9 %    % Eosinophils 3 %    % Basophils 1 %    % Immature Granulocytes 1 %    NRBCs per 100 WBC 0 <1 /100    Absolute Neutrophils 1.8 1.6 - 8.3 10e3/uL    Absolute Lymphocytes 1.9 0.8 - 5.3 10e3/uL    Absolute Monocytes 0.4 0.0 - 1.3 10e3/uL    Absolute Eosinophils 0.1 0.0 - 0.7 10e3/uL    Absolute Basophils 0.1 0.0 - 0.2 10e3/uL    Absolute Immature Granulocytes 0.0 <=0.4 10e3/uL    Absolute NRBCs 0.0 10e3/uL   CBC with Platelets & Differential     Status: None    Narrative    The following orders were created for panel order CBC with Platelets & Differential.  Procedure                               Abnormality         Status                     ---------                               -----------         ------                     CBC with platelets and d...[[id number]]                      Final result                 Please view results for these tests on the individual  orders.         ASSESSMENT  1 Sleep apnea needs F/U  2 hyperlipidemia on atorvastatin  3 thoracic outlet syndrome  4 history colon polyps with family history colon cancer  colonoscopy due 2026    Plan  We will reassess his labs today update his immunizations with a Tdap and refill his atorvastatin and place a referral to the sleep clinic.    This note was completed using Dragon voice recognition software.      Jeff Walsh MD  General Internal Medicine  [facility]  [phone]

## 2023-04-17 LAB — HBA1C MFR BLD: 6 %

## 2023-07-19 DIAGNOSIS — E78.5 HYPERLIPIDEMIA LDL GOAL <130: ICD-10-CM

## 2023-07-22 RX ORDER — ATORVASTATIN CALCIUM 20 MG/1
TABLET, FILM COATED ORAL
Qty: 90 TABLET | Refills: 0 | OUTPATIENT
Start: 2023-07-22

## 2023-08-01 ENCOUNTER — TELEPHONE (OUTPATIENT)
Dept: OPHTHALMOLOGY | Facility: CLINIC | Age: 58
End: 2023-08-01
Payer: COMMERCIAL

## 2023-08-03 ENCOUNTER — OFFICE VISIT (OUTPATIENT)
Dept: OPHTHALMOLOGY | Facility: CLINIC | Age: 58
End: 2023-08-03
Attending: INTERNAL MEDICINE
Payer: COMMERCIAL

## 2023-08-03 DIAGNOSIS — H25.041 POSTERIOR SUBCAPSULAR POLAR AGE-RELATED CATARACT OF RIGHT EYE: ICD-10-CM

## 2023-08-03 DIAGNOSIS — H52.13 HIGH MYOPIA, BILATERAL: Primary | ICD-10-CM

## 2023-08-03 DIAGNOSIS — H52.4 PRESBYOPIA: ICD-10-CM

## 2023-08-03 DIAGNOSIS — H53.9 VISION CHANGES: ICD-10-CM

## 2023-08-03 PROCEDURE — 92015 DETERMINE REFRACTIVE STATE: CPT | Performed by: OPTOMETRIST

## 2023-08-03 PROCEDURE — 92004 COMPRE OPH EXAM NEW PT 1/>: CPT | Performed by: OPTOMETRIST

## 2023-08-03 ASSESSMENT — CONF VISUAL FIELD
OD_SUPERIOR_TEMPORAL_RESTRICTION: 0
OS_NORMAL: 1
METHOD: COUNTING FINGERS
OD_SUPERIOR_NASAL_RESTRICTION: 0
OS_INFERIOR_NASAL_RESTRICTION: 0
OS_SUPERIOR_TEMPORAL_RESTRICTION: 0
OS_INFERIOR_TEMPORAL_RESTRICTION: 0
OD_INFERIOR_TEMPORAL_RESTRICTION: 0
OD_NORMAL: 1
OD_INFERIOR_NASAL_RESTRICTION: 0
OS_SUPERIOR_NASAL_RESTRICTION: 0

## 2023-08-03 ASSESSMENT — REFRACTION_WEARINGRX
OS_ADD: +2.25
OD_CYLINDER: +0.75
OS_SPHERE: -7.50
OD_SPHERE: -10.00
OD_AXIS: 100
OD_ADD: +2.25
OS_CYLINDER: +1.25
OS_AXIS: 077

## 2023-08-03 ASSESSMENT — REFRACTION
OD_AXIS: 074
OS_AXIS: 106
OS_CYLINDER: +0.75
OD_SPHERE: -8.75
OS_SPHERE: -7.75
OD_CYLINDER: +0.75

## 2023-08-03 ASSESSMENT — VISUAL ACUITY
OS_CC: 20/20
METHOD: SNELLEN - LINEAR
OD_CC+: -1
CORRECTION_TYPE: GLASSES
OD_CC: 20/20

## 2023-08-03 ASSESSMENT — TONOMETRY
IOP_METHOD: ICARE
OD_IOP_MMHG: 20
OS_IOP_MMHG: 19

## 2023-08-03 ASSESSMENT — REFRACTION_MANIFEST
OS_CYLINDER: +1.25
OD_ADD: +2.25
OS_SPHERE: -7.00
OS_AXIS: 075
OD_AXIS: 101
OS_ADD: +2.25
OD_SPHERE: -9.00
OD_CYLINDER: +0.50

## 2023-08-03 ASSESSMENT — SLIT LAMP EXAM - LIDS
COMMENTS: MGD
COMMENTS: MGD

## 2023-08-03 ASSESSMENT — CUP TO DISC RATIO
OS_RATIO: 0.40
OD_RATIO: 0.40

## 2023-08-03 NOTE — NURSING NOTE
Chief Complaints and History of Present Illnesses   Patient presents with    Annual Eye Exam     Chief Complaint(s) and History of Present Illness(es)       Annual Eye Exam              Laterality: right eye    Associated symptoms: Negative for flashes, floaters, itching and burning    Treatments tried: no treatments    Pain scale: 0/10              Comments    Patient states cloudiness right eye that began about 1 year ago. Patient states it is still present directly in the middle of his field. Patient states last routine exam was 1.5 years ago.  JELENA Cote August 3, 2023 8:30 AM

## 2023-08-03 NOTE — PROGRESS NOTES
A/P  1.) High Myopia/Presbyopia OU  -Overminused in current Rx, sees well with reduced power today  -Reviewed flashes/floaters and need for stat eye eval should they occur    2.) Lenticular opacity right eye  -Very tr PSC right eye, likely responsible for mild clouding  -BCVA 20/20, would not recommend cataract surgery at this time  -No steroid use. Prediabetic only  -Continue to monitor for changes    Monitor 1-2 years comprehensive, sooner prn    I have confirmed the patient's CC, HPI and reviewed Past Medical History, Past Surgical History, Social History, Family History, Problem List, Medication List and agree with Tech note.     Mable Burgos, WAN FAAO FSLS

## 2023-08-04 ENCOUNTER — OFFICE VISIT (OUTPATIENT)
Dept: URGENT CARE | Facility: URGENT CARE | Age: 58
End: 2023-08-04
Payer: COMMERCIAL

## 2023-08-04 VITALS
SYSTOLIC BLOOD PRESSURE: 111 MMHG | DIASTOLIC BLOOD PRESSURE: 65 MMHG | HEART RATE: 83 BPM | WEIGHT: 157 LBS | HEIGHT: 70 IN | OXYGEN SATURATION: 99 % | BODY MASS INDEX: 22.48 KG/M2 | RESPIRATION RATE: 15 BRPM | TEMPERATURE: 96.8 F

## 2023-08-04 DIAGNOSIS — M26.621 TMJ TENDERNESS, RIGHT: Primary | ICD-10-CM

## 2023-08-04 PROCEDURE — 99213 OFFICE O/P EST LOW 20 MIN: CPT | Performed by: INTERNAL MEDICINE

## 2023-08-04 RX ORDER — NAPROXEN 500 MG/1
500 TABLET ORAL 2 TIMES DAILY WITH MEALS
Qty: 28 TABLET | Refills: 0 | Status: SHIPPED | OUTPATIENT
Start: 2023-08-04 | End: 2023-08-18

## 2023-08-04 NOTE — PATIENT INSTRUCTIONS
Ice  Naprosyn 500 mg 2 x day with food  Softer foods  Support jaw with yawning  No hard to chew foods, candy, ice    Ask dentist if grinding teeth

## 2023-08-04 NOTE — PROGRESS NOTES
"ASSESSMENT AND PLAN:      ICD-10-CM    1. TMJ tenderness, right  M26.621 naproxen (NAPROSYN) 500 MG tablet        Patient Instructions     Ice  Naprosyn 500 mg 2 x day with food  Softer foods  Support jaw with yawning  No hard to chew foods, candy, ice    Ask dentist if grinding teeth      Return if symptoms worsen or fail to improve.        Elicia Chen MD  [facility]    Subjective     Marcello Pastor is a 58 year old who presents for Patient presents with:  Jaw Pain: Has had jaw pain for x7 days, right side when opens mouth or chewing it hurts and the pain runs up the side of his head     [facility]        Onset of symptoms was 1 week(s) ago.    Location: right jaw  Started right after eating an orange  Painful with biting and opening jaw  Treatment measures tried include: aspirin  Relief from treatment: none    Concerned about jaw dislocation    Patient Active Problem List   Diagnosis    Hyperlipidemia    Diverticulosis    Snoring    ANDREA (obstructive sleep apnea)     Current Outpatient Medications   Medication Sig Dispense Refill    atorvastatin (LIPITOR) 20 MG tablet Take 1 tablet (20 mg) by mouth daily 90 tablet 3    cholecalciferol (D-VI-SOL, VITAMIN D3) 10 mcg/mL (400 units/mL) LIQD liquid Take 50 mcg by mouth daily      multivitamin, therapeutic (THERA-VIT) TABS tablet Take 1 tablet by mouth daily      naproxen (NAPROSYN) 500 MG tablet Take 1 tablet (500 mg) by mouth 2 times daily (with meals) for 14 days 28 tablet 0       Review of Systems        Objective    /65   Pulse 83   Temp 96.8  F (36  C) (Temporal)   Resp 15   Ht 1.765 m (5' 9.5\")   Wt 71.2 kg (157 lb)   SpO2 99%   BMI 22.85 kg/m    Physical Exam  Vitals reviewed.   Constitutional:       Appearance: Normal appearance.   HENT:      Right Ear: There is impacted cerumen.      Left Ear: Tympanic membrane normal.      Mouth/Throat:      Mouth: Mucous membranes are moist.      Pharynx: " Oropharynx is clear.      Comments: Normal jaw movement.  Face symmetrical.  No dislocation  Neurological:      Mental Status: He is alert.

## 2024-02-24 ENCOUNTER — LAB (OUTPATIENT)
Dept: LAB | Facility: CLINIC | Age: 59
End: 2024-02-24
Payer: COMMERCIAL

## 2024-02-24 ENCOUNTER — OFFICE VISIT (OUTPATIENT)
Dept: INTERNAL MEDICINE | Facility: CLINIC | Age: 59
End: 2024-02-24
Payer: COMMERCIAL

## 2024-02-24 VITALS
HEART RATE: 80 BPM | WEIGHT: 158 LBS | HEIGHT: 70 IN | OXYGEN SATURATION: 100 % | DIASTOLIC BLOOD PRESSURE: 76 MMHG | BODY MASS INDEX: 22.62 KG/M2 | SYSTOLIC BLOOD PRESSURE: 110 MMHG

## 2024-02-24 DIAGNOSIS — R41.3 MEMORY CHANGE: ICD-10-CM

## 2024-02-24 DIAGNOSIS — R73.09 ELEVATED GLUCOSE: Primary | ICD-10-CM

## 2024-02-24 DIAGNOSIS — E78.5 HYPERLIPIDEMIA, UNSPECIFIED HYPERLIPIDEMIA TYPE: ICD-10-CM

## 2024-02-24 DIAGNOSIS — E78.5 HYPERLIPIDEMIA LDL GOAL <130: ICD-10-CM

## 2024-02-24 DIAGNOSIS — R73.09 ELEVATED GLUCOSE: ICD-10-CM

## 2024-02-24 LAB
ANION GAP SERPL CALCULATED.3IONS-SCNC: 10 MMOL/L (ref 7–15)
BUN SERPL-MCNC: 19.8 MG/DL (ref 6–20)
CALCIUM SERPL-MCNC: 9.3 MG/DL (ref 8.6–10)
CHLORIDE SERPL-SCNC: 105 MMOL/L (ref 98–107)
CHOLEST SERPL-MCNC: 213 MG/DL
CREAT SERPL-MCNC: 0.91 MG/DL (ref 0.67–1.17)
DEPRECATED HCO3 PLAS-SCNC: 27 MMOL/L (ref 22–29)
EGFRCR SERPLBLD CKD-EPI 2021: >90 ML/MIN/1.73M2
FASTING STATUS PATIENT QL REPORTED: YES
GLUCOSE SERPL-MCNC: 104 MG/DL (ref 70–99)
HBA1C MFR BLD: 6.2 %
HDLC SERPL-MCNC: 74 MG/DL
LDLC SERPL CALC-MCNC: 118 MG/DL
NONHDLC SERPL-MCNC: 139 MG/DL
POTASSIUM SERPL-SCNC: 4.2 MMOL/L (ref 3.4–5.3)
SODIUM SERPL-SCNC: 142 MMOL/L (ref 135–145)
TRIGL SERPL-MCNC: 104 MG/DL
TSH SERPL DL<=0.005 MIU/L-ACNC: 3.3 UIU/ML (ref 0.3–4.2)
VIT B12 SERPL-MCNC: 750 PG/ML (ref 232–1245)

## 2024-02-24 PROCEDURE — 83036 HEMOGLOBIN GLYCOSYLATED A1C: CPT | Performed by: INTERNAL MEDICINE

## 2024-02-24 PROCEDURE — 82607 VITAMIN B-12: CPT | Performed by: INTERNAL MEDICINE

## 2024-02-24 PROCEDURE — 99396 PREV VISIT EST AGE 40-64: CPT | Performed by: INTERNAL MEDICINE

## 2024-02-24 PROCEDURE — 84443 ASSAY THYROID STIM HORMONE: CPT | Performed by: PATHOLOGY

## 2024-02-24 PROCEDURE — 80048 BASIC METABOLIC PNL TOTAL CA: CPT | Performed by: PATHOLOGY

## 2024-02-24 PROCEDURE — 36415 COLL VENOUS BLD VENIPUNCTURE: CPT | Performed by: PATHOLOGY

## 2024-02-24 PROCEDURE — 80061 LIPID PANEL: CPT | Performed by: PATHOLOGY

## 2024-02-24 PROCEDURE — 99000 SPECIMEN HANDLING OFFICE-LAB: CPT | Performed by: PATHOLOGY

## 2024-02-24 RX ORDER — ATORVASTATIN CALCIUM 20 MG/1
20 TABLET, FILM COATED ORAL DAILY
Qty: 90 TABLET | Refills: 3 | Status: SHIPPED | OUTPATIENT
Start: 2024-02-24

## 2024-02-24 RX ORDER — CHOLECALCIFEROL (VITAMIN D3) 50 MCG
2 TABLET ORAL DAILY
COMMUNITY
Start: 2024-02-24

## 2024-02-24 ASSESSMENT — PAIN SCALES - GENERAL: PAINLEVEL: NO PAIN (0)

## 2024-02-24 NOTE — PROGRESS NOTES
"HPI  58-year-old  presents today for annual visit and right ankle pain.  In October [place] he noticed some pain and discomfort in his right ankle while climbing stairs.  He has had some persistent pain and discomfort in the ankle aggravated by dorsiflexion and external rotation.  He has noted a little bit of clicking in association with this.  He has not had any locking or buckling.  Recently he has noted improvement in this.  In association with this with some aching pain in the leg at night which is also improved recently.  He also had concerns about memory and that he can be easily distracted and sometimes has difficulty focusing back on what he was focused on earlier.  No other concerns regarding the memory.  He is recently ramped up his exercise and going to the gym 3 times a week and this appears to correlate with his improvement in the ankle symptoms.  Is also had some intermittent TMJ symptoms recently.  He feels eats a healthy diet sleeping well minimal alcohol no tobacco  Past Medical History:   Diagnosis Date    Diverticulosis     Hyperlipidemia 8/9/2011    ANDREA (obstructive sleep apnea) 6/10/2019    Thoracic outlet syndrome     No symptoms    Uncomplicated asthma      Past Surgical History:   Procedure Laterality Date    COLONOSCOPY  8/31/2011    Procedure:COLONOSCOPY; Surgeon:VU ELLISON; Location: [facility]    COLONOSCOPY  06/2016     Family History   Problem Relation Age of Onset    Asthma Mother     Colon Cancer Mother     Macular Degeneration Father     Coronary Artery Disease Early Onset Father     Diabetes Father     Coronary Artery Disease Early Onset Paternal Grandmother     Asthma Son     Glaucoma No family hx of          Exam:  /76 (BP Location: Right arm, Patient Position: Sitting, Cuff Size: Adult Regular)   Pulse 80   Ht 1.765 m (5' 9.5\")   Wt 71.7 kg (158 lb)   SpO2 100%   BMI 23.00 kg/m    158 lbs 0 oz  Physical Exam "   The patient is alert, oriented with a clear sensorium.   Skin shows no lesions or rashes and good turgor.   Head is normocephalic and atraumatic.  Crepitus over the TMJ right greater than left  Eyes show PERRLA   Ears show normal TMs bilaterally.   Mouth shows clear oral mucosa.   Neck shows no nodes, thyromegaly or bruits.   Back is non tender.  Lungs are clear to percussion and auscultation.   Heart shows normal S1 and S2 without murmur or gallop.   Abdomen is soft and nontender without masses or organomegaly.   Genitalia show normal testes. No evidence of inguinal hernia.  Rectal shows small smooth prostate without nodules or masses.  Extremities show no edema and no evidence of active synovitis.  The right ankle shows some laxity with anterior drawer testing good stability laterally and medially some tenderness over the talofibular ligament  Neurologic examination shows cranial nerves II-XII intact. Motor shows 5/5 strength. Reflexes are symmetric. Cerebellar testing shows normal tandem gait.  Romberg negative.  Labs reviewed:  Results for orders placed or performed in visit on 02/24/24   Basic metabolic panel     Status: Abnormal   Result Value Ref Range    Sodium 142 135 - 145 mmol/L    Potassium 4.2 3.4 - 5.3 mmol/L    Chloride 105 98 - 107 mmol/L    Carbon Dioxide (CO2) 27 22 - 29 mmol/L    Anion Gap 10 7 - 15 mmol/L    Urea Nitrogen 19.8 6.0 - 20.0 mg/dL    Creatinine 0.91 0.67 - 1.17 mg/dL    GFR Estimate >90 >60 mL/min/1.73m2    Calcium 9.3 8.6 - 10.0 mg/dL    Glucose 104 (H) 70 - 99 mg/dL   Hemoglobin A1c     Status: Abnormal   Result Value Ref Range    Hemoglobin A1C 6.2 (H) <5.7 %   Lipid Profile     Status: Abnormal   Result Value Ref Range    Cholesterol 213 (H) <200 mg/dL    Triglycerides 104 <150 mg/dL    Direct Measure HDL 74 >=40 mg/dL    LDL Cholesterol Calculated 118 (H) <=100 mg/dL    Non HDL Cholesterol 139 (H) <130 mg/dL    Patient Fasting > 8hrs? Yes     Narrative     Cholesterol  Desirable:  <200 mg/dL    Triglycerides  Normal:  Less than 150 mg/dL  Borderline High:  150-199 mg/dL  High:  200-499 mg/dL  Very High:  Greater than or equal to 500 mg/dL    Direct Measure HDL  Female:  Greater than or equal to 50 mg/dL   Male:  Greater than or equal to 40 mg/dL    LDL Cholesterol  Desirable:  <100mg/dL  Above Desirable:  100-129 mg/dL   Borderline High:  130-159 mg/dL   High:  160-189 mg/dL   Very High:  >= 190 mg/dL    Non HDL Cholesterol  Desirable:  130 mg/dL  Above Desirable:  130-159 mg/dL  Borderline High:  160-189 mg/dL  High:  190-219 mg/dL  Very High:  Greater than or equal to 220 mg/dL   TSH with free T4 reflex     Status: Normal   Result Value Ref Range    TSH 3.30 0.30 - 4.20 uIU/mL   Vitamin B12     Status: Normal   Result Value Ref Range    Vitamin B12 750 232 - 1,245 pg/mL         ASSESSMENT  1 Sleep apnea   2 hyperlipidemia on atorvastatin  3 thoracic outlet syndrome  4 IGT  5 history colon polyps with family history colon cancer colonoscopy due 2026  6 resolving ankle sprain  7 TMJ syndrome    Plan  Use diclofenac gel for the TMJ I gave her ankle rehabilitation exercises to do for the ankle.  Will reassess his labs today refilled his atorvastatin encouraged him regarding his physical activity and exercise regimen         This note was completed using Dragon voice recognition software.      Jeff Walsh MD  General Internal Medicine  [facility]  [phone]

## 2024-02-24 NOTE — NURSING NOTE
"Chief Complaint   Patient presents with    RECHECK     Right ankle when flexing or stop on uneven ground but hasn't happened in last week also follow yearly visit, blood tests if necessary.       Vitals:    02/24/24 0757   BP: 110/76   BP Location: Right arm   Patient Position: Sitting   Cuff Size: Adult Regular   Pulse: 80   SpO2: 100%   Weight: 71.7 kg (158 lb)   Height: 1.765 m (5' 9.5\")       Body mass index is 23 kg/m .                          Ana Roach, EMT  "

## 2024-08-13 ENCOUNTER — VIRTUAL VISIT (OUTPATIENT)
Dept: INTERNAL MEDICINE | Facility: CLINIC | Age: 59
End: 2024-08-13
Payer: COMMERCIAL

## 2024-08-13 DIAGNOSIS — U07.1 INFECTION DUE TO 2019 NOVEL CORONAVIRUS: Primary | ICD-10-CM

## 2024-08-13 PROCEDURE — 99213 OFFICE O/P EST LOW 20 MIN: CPT | Mod: 95 | Performed by: INTERNAL MEDICINE

## 2024-08-13 NOTE — PROGRESS NOTES
Marcello is a 59 year old who is being evaluated via a billable video visit.    How would you like to obtain your AVS? Mail a copy  If the video visit is dropped, the invitation should be resent by: Text to cell phone: [phone]  Will anyone else be joining your video visit? No      Assessment & Plan     Infection due to 2019 novel coronavirus  Discussed management of COVID-19 infection with patient.  Given that he has no significant risk factors, recommend symptomatic management.  Discussed symptoms of severe COVID and the need for urgent evaluation in those cases.  Patient was also advised on isolation precautions.      I spent a total of 20 minutes on the day of the visit.   Time spent by me doing chart review, history and exam, documentation and further activities per the note                No follow-ups on file.    Subjective   Marcello is a 59 year old, presenting for the following health issues:  RECHECK    History of Present Illness       Reason for visit:  Covid treatment    He eats 2-3 servings of fruits and vegetables daily.He consumes 0 sweetened beverage(s) daily.He exercises with enough effort to increase his heart rate 9 or less minutes per day.  He exercises with enough effort to increase his heart rate 3 or less days per week.   He is taking medications regularly.         COVID-19 Symptom Review  How many days ago did these symptoms start? 4 days ago    He reports that he developed cough and chest congestion 4 days ago. He had a low-grade temperature 2 days ago. He also had a lot of general malaise. He started to feel better yesterday. He has some nasal congestion. He denies shortness of breath.     Are any of the following symptoms significant for you?  New or worsening difficulty breathing? No  Worsening cough? Yes, I am coughing up mucus.  Fever or chills? Yes, I felt feverish or had chills.  Headache: No  Sore throat: No  Chest pain: No  Diarrhea: No  Body aches? YES    What treatments has patient  tried? none   Does patient live in a nursing home, group home, or shelter? No  Does patient have a way to get food/medications during quarantined? Yes, I have a friend or family member who can help me.              Objective         Vitals:  No vitals were obtained today due to virtual visit.    Physical Exam   GENERAL: alert and no distress  EYES: Eyes grossly normal to inspection.  No discharge or erythema, or obvious scleral/conjunctival abnormalities.  RESP: No audible wheeze, cough, or visible cyanosis.    SKIN: Visible skin clear. No significant rash, abnormal pigmentation or lesions.  NEURO: Cranial nerves grossly intact.  Mentation and speech appropriate for age.  PSYCH: Appropriate affect, tone, and pace of words          Video-Visit Details    Type of service:  Video Visit   Originating Location (pt. Location): Home    Distant Location (provider location):  Off-site  Platform used for Video Visit: Timothy  Signed Electronically by: Mandy Weiner MD

## 2025-02-25 DIAGNOSIS — E78.5 HYPERLIPIDEMIA LDL GOAL <130: ICD-10-CM

## 2025-03-03 NOTE — TELEPHONE ENCOUNTER
Last Written Prescription:  atorvastatin (LIPITOR) 20 MG tablet  20 mg, DAILY           Summary: Take 1 tablet (20 mg) by mouth daily, Disp-90 tablet, R-3, E-Prescribe  Dose, Route, Frequency: 20 mg, Oral, DAILYStart: 02/24/2024Ord/Sold: 02/24/2024 (O)Ordered On: 02/24/2024Pharmacy: [facility]/pharmacy #[id number] [address] [address]ReportDx Associated: Taking: Long-term: Med Note:            atorvastatin Note (5/1/2017): Per Dr Dennis - no further refills until seen and labs are completed.        Patient Sig: Take 1 tablet (20 mg) by mouth daily  [place]ing [place] [facility]  Authorized By: Jeff Walsh MD  Dispense: 90 tablet  Refills: 3 ordered       ----------------------  Last Visit Date: 2/24/24  Future Visit Date//: None  ----------------------  Recent Labs   Lab Test 02/24/24  0849 04/15/23  1109   CHOL 213* 173   HDL 74 67   * 88   TRIG 104 91        []  Refill decision: Medication refilled per  Medication Refill in Ambulatory Care  policy.     []  Supervision: no future appointment scheduled. Scheduling has been notified to contact the pt for appointment.      [x]  Refill decision: Medication unable to be refilled by RN due to:     Failed refill protocol    Antihyperlipidemic agents [id number]/03/2025 04:31 PM   Protocol Details LDL on file in the past 12 months        []    Pt not seen within past 12 months;  No FOV;  or FOV exceeds timeframe per protocol requirements  []    Compliance - lapse in therapy/gap in refills; No Shows; Cancellations  []    Verification - order discrepancy, clarification needed, Sig modification needed  []    Controlled medication  []    Medication not included in refill protocol policy  []    Abnormal labs/test:  []    Overdue labs/test:    []    Medication not active on Pt's med list  []    Drug interaction Warning  []    Medication - Last script is Reported/Historical/Transitional  []    Advanced refill request  []     Review Needed: New med; Med adjusted within <= 30 days; Safety Alert; Lab monitoring required  []    Other:     Request from pharmacy:  Requested Prescriptions   Pending Prescriptions Disp Refills    atorvastatin (LIPITOR) 20 MG tablet [Pharmacy Med Name: ATORVASTATIN 20 MG TABLET] 90 tablet 3     Sig: TAKE 1 TABLET BY MOUTH EVERY DAY       Antihyperlipidemic agents Failed - 3/3/2025  4:31 PM        Failed - LDL on file in the past 12 months        Passed - Medication is active on med list and the sig matches. RN to manually verify dose and sig if red X/fail.     If the protocol passes (green check), you do not need to verify med dose and sig.    A prescription matches if they are the same clinical intention.    For Example: once daily and every morning are the same.    For all fails (red x), verify dose and sig.    If the refill does match what is on file, the RN can still proceed to approve the refill request.     If they do not match, route to the appropriate provider.             Passed - Recent (12 mo) or future (90 days) visit within the authorizing provider's specialty     The patient must have completed an in-person or virtual visit within the past 12 months or has a future visit scheduled within the next 90 days with the authorizing provider s specialty.  Urgent care and e-visits do not qualify as an office visit for this protocol.          Passed - Patient is age 18 years or older

## 2025-03-04 RX ORDER — ATORVASTATIN CALCIUM 20 MG/1
20 TABLET, FILM COATED ORAL DAILY
Qty: 90 TABLET | Refills: 0 | Status: SHIPPED | OUTPATIENT
Start: 2025-03-04

## 2025-03-04 NOTE — TELEPHONE ENCOUNTER
Spoke with the patient to schedule, but he will call back once he has his calendar. Agreed to a MyC message with scheduling info

## 2025-03-06 NOTE — TELEPHONE ENCOUNTER
Patient Contacted for the patient to call back and schedule the following:    Appointment type: Physical/Return   Provider: PCP  Return date: Next available   Specialty phone number: [phone]

## 2025-03-29 ENCOUNTER — HEALTH MAINTENANCE LETTER (OUTPATIENT)
Age: 60
End: 2025-03-29

## 2025-04-20 SDOH — HEALTH STABILITY: PHYSICAL HEALTH: ON AVERAGE, HOW MANY DAYS PER WEEK DO YOU ENGAGE IN MODERATE TO STRENUOUS EXERCISE (LIKE A BRISK WALK)?: 1 DAY

## 2025-04-20 SDOH — HEALTH STABILITY: PHYSICAL HEALTH: ON AVERAGE, HOW MANY MINUTES DO YOU ENGAGE IN EXERCISE AT THIS LEVEL?: 30 MIN

## 2025-04-20 ASSESSMENT — SOCIAL DETERMINANTS OF HEALTH (SDOH): HOW OFTEN DO YOU GET TOGETHER WITH FRIENDS OR RELATIVES?: ONCE A WEEK

## 2025-04-21 ENCOUNTER — OFFICE VISIT (OUTPATIENT)
Dept: INTERNAL MEDICINE | Facility: CLINIC | Age: 60
End: 2025-04-21
Payer: COMMERCIAL

## 2025-04-21 VITALS
OXYGEN SATURATION: 97 % | WEIGHT: 164 LBS | BODY MASS INDEX: 24.29 KG/M2 | SYSTOLIC BLOOD PRESSURE: 108 MMHG | HEIGHT: 69 IN | RESPIRATION RATE: 18 BRPM | HEART RATE: 72 BPM | DIASTOLIC BLOOD PRESSURE: 73 MMHG

## 2025-04-21 DIAGNOSIS — E78.5 HYPERLIPIDEMIA LDL GOAL <130: ICD-10-CM

## 2025-04-21 DIAGNOSIS — G47.33 OSA (OBSTRUCTIVE SLEEP APNEA): Primary | ICD-10-CM

## 2025-04-21 PROCEDURE — 90677 PCV20 VACCINE IM: CPT | Performed by: INTERNAL MEDICINE

## 2025-04-21 PROCEDURE — 99396 PREV VISIT EST AGE 40-64: CPT | Mod: 25 | Performed by: INTERNAL MEDICINE

## 2025-04-21 PROCEDURE — 3078F DIAST BP <80 MM HG: CPT | Performed by: INTERNAL MEDICINE

## 2025-04-21 PROCEDURE — 90471 IMMUNIZATION ADMIN: CPT | Performed by: INTERNAL MEDICINE

## 2025-04-21 PROCEDURE — 3074F SYST BP LT 130 MM HG: CPT | Performed by: INTERNAL MEDICINE

## 2025-04-21 RX ORDER — ATORVASTATIN CALCIUM 20 MG/1
20 TABLET, FILM COATED ORAL DAILY
Qty: 90 TABLET | Refills: 0 | Status: SHIPPED | OUTPATIENT
Start: 2025-04-21 | End: 2025-04-21

## 2025-04-21 RX ORDER — ROSUVASTATIN CALCIUM 10 MG/1
10 TABLET, COATED ORAL DAILY
Qty: 90 TABLET | Refills: 3 | Status: SHIPPED | OUTPATIENT
Start: 2025-04-21

## 2025-04-21 NOTE — PROGRESS NOTES
HPI  60-year-old  here today for physical examination.  His wife thinks he needs a head scan because occasionally has difficulty word finding.  He has not had any difficulty at work he has not had any issues in regards to his cognition and his profession at all.  He has not been doing as much exercise at all recently but he has been committed to going to the gym more frequently doing a combination of strength training and cardiovascular training.  His diet is healthy by his report.  He is sleeping sporadically has had documented sleep apnea which is untreated.  We discussed that this could be a contributing factor to some cognition issues and that he would likely function better feel better would be more energetic but that was adequately treated.  Has had a home sleep study in the past but has never been started on CPAP.  Will refer back to the sleep clinic regarding this.  He is interested in a switch from atorvastatin to rosuvastatin.  He is unable to schedule the labs prior to the visit so we will get that scheduled now.  He has noted some crepitus in the neck no radicular symptoms or pain rating down into the arms.  Past Medical History:   Diagnosis Date    Diverticulosis     Hyperlipidemia 8/9/2011    ANDREA (obstructive sleep apnea) 6/10/2019    Thoracic outlet syndrome     No symptoms    Uncomplicated asthma      Past Surgical History:   Procedure Laterality Date    COLONOSCOPY  8/31/2011    Procedure:COLONOSCOPY; Surgeon:VU ELLISON; Location: GI    COLONOSCOPY  06/2016     Family History   Problem Relation Age of Onset    Asthma Mother     Colon Cancer Mother     Macular Degeneration Father     Coronary Artery Disease Early Onset Father     Diabetes Father     Coronary Artery Disease Early Onset Paternal Grandmother     Asthma Son     Glaucoma No family hx of          Exam:  /73 (BP Location: Right arm, Patient Position: Sitting, Cuff Size: Adult Regular)   Pulse 72   Resp  "18   Ht 1.765 m (5' 9.49\")   Wt 74.4 kg (164 lb)   SpO2 97%   BMI 23.88 kg/m    164 lbs 0 oz  Physical Exam   The patient is alert, oriented with a clear sensorium.   Skin shows no lesions or rashes and good turgor.   Head is normocephalic and atraumatic.   Eyes show PERRLA with bilateral arcus senilis  Ears show normal TMs bilaterally.   Mouth shows clear oral mucosa.   Neck shows no nodes, thyromegaly or bruits.  There is decreased range of motion to rotation on the left and extension.  No palpable crepitance and negative Spurling's test bilaterally  Back is non tender.  Lungs are clear to percussion and auscultation.   Heart shows normal S1 and S2 without murmur or gallop.   Abdomen is soft and nontender without masses or organomegaly.   Genitalia show normal testes. No evidence of inguinal hernia.  Rectal shows small smooth prostate without nodules or masses.  Extremities show no edema and no evidence of active synovitis.   Neurologic examination shows cranial nerves II-XII intact. Motor shows 5/5 strength. Reflexes are symmetric. Cerebellar testing shows normal tandem gait.  Romberg negative.      ASSESSMENT  1 cervical osteoarthritis  2 Sleep apnea untreated  3 hyperlipidemia on atorvastatin  4 IGT  5 history colon polyps with family history colon cancer colonoscopy due 2026  6 thoracic outlet syndrome  7 TMJ syndrome resolved    Plan  For the arthritis Bianca do isometric strengthening exercises and we reviewed this.  He can follow-up with fasting labs.  We discussed and reinforced his exercise regimen and he needs to see the sleep clinic and get the sleep apnea adequately treated and then see how he does with his word finding and cognitive function    This note was completed using Dragon voice recognition software.      Jeff Walsh MD  General Internal Medicine  [facility]  [phone]        "

## 2025-04-24 ENCOUNTER — LAB (OUTPATIENT)
Dept: LAB | Facility: CLINIC | Age: 60
End: 2025-04-24
Payer: COMMERCIAL

## 2025-04-24 DIAGNOSIS — E78.5 HYPERLIPIDEMIA: ICD-10-CM

## 2025-04-24 DIAGNOSIS — R73.09 ELEVATED GLUCOSE: ICD-10-CM

## 2025-04-24 LAB
ANION GAP SERPL CALCULATED.3IONS-SCNC: 8 MMOL/L (ref 7–15)
BUN SERPL-MCNC: 18.6 MG/DL (ref 8–23)
CALCIUM SERPL-MCNC: 9 MG/DL (ref 8.8–10.4)
CHLORIDE SERPL-SCNC: 106 MMOL/L (ref 98–107)
CHOLEST SERPL-MCNC: 182 MG/DL
CREAT SERPL-MCNC: 0.84 MG/DL (ref 0.67–1.17)
EGFRCR SERPLBLD CKD-EPI 2021: >90 ML/MIN/1.73M2
EST. AVERAGE GLUCOSE BLD GHB EST-MCNC: 123 MG/DL
FASTING STATUS PATIENT QL REPORTED: YES
FASTING STATUS PATIENT QL REPORTED: YES
GLUCOSE SERPL-MCNC: 97 MG/DL (ref 70–99)
HBA1C MFR BLD: 5.9 %
HCO3 SERPL-SCNC: 24 MMOL/L (ref 22–29)
HDLC SERPL-MCNC: 58 MG/DL
LDLC SERPL CALC-MCNC: 100 MG/DL
NONHDLC SERPL-MCNC: 124 MG/DL
POTASSIUM SERPL-SCNC: 4.4 MMOL/L (ref 3.4–5.3)
SODIUM SERPL-SCNC: 138 MMOL/L (ref 135–145)
TRIGL SERPL-MCNC: 120 MG/DL

## 2025-04-24 PROCEDURE — 83036 HEMOGLOBIN GLYCOSYLATED A1C: CPT | Performed by: INTERNAL MEDICINE

## 2025-04-24 PROCEDURE — 99000 SPECIMEN HANDLING OFFICE-LAB: CPT | Performed by: PATHOLOGY

## 2025-07-22 ENCOUNTER — OFFICE VISIT (OUTPATIENT)
Dept: SLEEP MEDICINE | Facility: CLINIC | Age: 60
End: 2025-07-22
Attending: INTERNAL MEDICINE
Payer: COMMERCIAL

## 2025-07-22 VITALS
OXYGEN SATURATION: 96 % | HEART RATE: 99 BPM | HEIGHT: 70 IN | SYSTOLIC BLOOD PRESSURE: 100 MMHG | BODY MASS INDEX: 23.22 KG/M2 | WEIGHT: 162.2 LBS | DIASTOLIC BLOOD PRESSURE: 62 MMHG

## 2025-07-22 DIAGNOSIS — G47.33 OSA (OBSTRUCTIVE SLEEP APNEA): ICD-10-CM

## 2025-07-22 DIAGNOSIS — G47.33 OBSTRUCTIVE SLEEP APNEA SYNDROME: Primary | ICD-10-CM

## 2025-07-22 DIAGNOSIS — R06.83 SNORING: ICD-10-CM

## 2025-07-22 DIAGNOSIS — G47.52 DREAM ENACTMENT BEHAVIOR: ICD-10-CM

## 2025-07-22 PROCEDURE — 3074F SYST BP LT 130 MM HG: CPT | Performed by: INTERNAL MEDICINE

## 2025-07-22 PROCEDURE — 1126F AMNT PAIN NOTED NONE PRSNT: CPT | Performed by: INTERNAL MEDICINE

## 2025-07-22 PROCEDURE — 99215 OFFICE O/P EST HI 40 MIN: CPT | Performed by: INTERNAL MEDICINE

## 2025-07-22 PROCEDURE — 3078F DIAST BP <80 MM HG: CPT | Performed by: INTERNAL MEDICINE

## 2025-07-22 ASSESSMENT — SLEEP AND FATIGUE QUESTIONNAIRES
HOW LIKELY ARE YOU TO NOD OFF OR FALL ASLEEP WHEN YOU ARE A PASSENGER IN A CAR FOR AN HOUR WITHOUT A BREAK: SLIGHT CHANCE OF DOZING
HOW LIKELY ARE YOU TO NOD OFF OR FALL ASLEEP WHILE SITTING INACTIVE IN A PUBLIC PLACE: WOULD NEVER DOZE
HOW LIKELY ARE YOU TO NOD OFF OR FALL ASLEEP WHILE SITTING QUIETLY AFTER LUNCH WITHOUT ALCOHOL: WOULD NEVER DOZE
HOW LIKELY ARE YOU TO NOD OFF OR FALL ASLEEP WHILE LYING DOWN TO REST IN THE AFTERNOON WHEN CIRCUMSTANCES PERMIT: WOULD NEVER DOZE
HOW LIKELY ARE YOU TO NOD OFF OR FALL ASLEEP IN A CAR, WHILE STOPPED FOR A FEW MINUTES IN TRAFFIC: SLIGHT CHANCE OF DOZING
HOW LIKELY ARE YOU TO NOD OFF OR FALL ASLEEP WHILE SITTING AND TALKING TO SOMEONE: WOULD NEVER DOZE
HOW LIKELY ARE YOU TO NOD OFF OR FALL ASLEEP WHILE WATCHING TV: SLIGHT CHANCE OF DOZING
HOW LIKELY ARE YOU TO NOD OFF OR FALL ASLEEP WHILE SITTING AND READING: SLIGHT CHANCE OF DOZING

## 2025-07-22 NOTE — PROGRESS NOTES
Outpatient Sleep Medicine Consultation  July 22, 2025      Name: Marcello Pastor MRN# [id number]   Age: 60 year old YOB: 1965     Date of Consultation: July 22, 2025  Consultation is requested by: Jeff Walsh MD  [address] [place] [id number] Jeff Walsh  Primary care provider: Jeff Walsh         Reason for Sleep Consult:     Marcello Pastor is a 60 year old male for complaints of apneas, snoring a night. He is interested in CPAP treatment.      Chief Complaint   Patient presents with    Snoring     Snoring, stops breathing,             Assessment and Plan:     Summary Sleep Diagnoses:  Mild obstructive sleep apnea-AHI of 8.7 on home HST 2017(currently untreated)  Dream enactment behavior-given reports of screaming and thrashing in sleep 3 times/ year-could be due to untreated ANDREA/low suspicion for RBD.  Snoring     Comorbid Diagnoses:  Hyperlipidemia      Summary Recommendations:  Discussed with patient option for repeat sleep study, ideally in -lab to evaluate for possible REM parasomnia. Given these events occur only 3 times per year, patient would prefer to focus on treatment of his mild ANDREA. Prescription for CPAP given to patient. Asked patient to monitor his parasomnia symptoms on CPAP, if no improvement/increase in frequency of episodes of dream enactment behavior despite optimal treatment of sleep apnea, he is amenable to an- in lab PSG.   Patient given references for local DMEs  Discussed with patient if there are issues with machine or the mask, he should contact the DME.  We discussed the mask exchange policy and the compliance goals.  He will be followed through sleep therapy management program.  Follow up after initiating CPAP use, in about 3 months to review CPAP compliance measures  Encourage  following healthy diet regular exercise  Patient was strongly advised to avoid driving, operating any heavy machinery or other hazardous situations while  "drowsy or sleepy.  Patient was counseled on the importance of driving while alert, to pull over if drowsy, or nap before getting into the vehicle if sleepy.     Summary Counseling:  See instructions    Potential diagnostic strategies and treatment therapies for obstructive sleep apnea reviewed  Risk of untreated disease reviewed  Specific testing instructions and video reviewed  We discussed the safety measures for sleep/bed environment to prevent trauma given the history of dream enactment behavior  Patient was strongly advised to avoid driving, operating any heavy machinery or other hazardous situations while drowsy or sleepy.  Patient was counseled on the importance of driving while alert, to pull over if drowsy, or nap before getting into the vehicle if sleepy      The above note was dictated using voice recognition software. Although reviewed after completion, some word and grammatical error may remain . Please contact the author for any clarifications.    Patient was staffed with Dr.Pusalavidyasagar Tonny Campoverde DO   Sleep Medicine Fellow    Attending: As the attending physician I was present with  Tonny Campoverde DO during this clinic visit. I personally reviewed the boyd aspects of the history, chart review and discussed the plan with the patient and I agree with the above documentation.     \" Total time spent was 45 minutes for this appointment on this date of service which include time spent before, during and after the visit for chart review, patient care, counseling and coordination of care including documentation.\"      Adebayo Bangura MD  [facility]  [address], [address], [place] [address]            History of Present Illness:     Marcello Pastor is a 60 year old male presents for mild ANDREA that was diagnosed in 2019 via home sleep test here. He was not able able to start treatment due to interruptions from COVID-19 pandemic and things going on in " his personal life. He is here today saying that his bed partner complains he still is snoring loudly and not breathing at night. Also, he tells us 3 times per year or so he will scream while sleeping due to a scary dream of being chased by a snake. He says this is also accompanied by thrashing    Denies feeling tired in the am, during the day, morning headaches  Sensation of smell: Normal    SLEEP-WAKE SCHEDULE:   Bedtime: 1 am  Falls asleep: 15 minutes  Sleep interruptions-sometimes wakes up, not sure why. Takes 1 hour to get back to sleep.  Wakes up: 8 am-9 am  Naps-none      PAST SLEEP EVALUATIONS:  Seen here in sleep medicine 5/2019        PREVIOUS SLEEP STUDIES HOME/PSG:  Date:   5/2029-HST showing Mild obstructive sleep apnea without hypoxemia. AHI 8.7 events per hour.           SCALES:        EPWORTH SLEEPINESS SCALE    Sitting and reading (Patient-Rptd) 1     Watching TV (Patient-Rptd) 1     Sitting, inactive in a public place (theatre or mtg.) (Patient-Rptd) 0      As a passenger in a car (Patient-Rptd) 1     Lying down to rest in the afternoon when circumstance permit (Patient-Rptd) 0     Sitting and talking to someone (Patient-Rptd) 0     Sitting quietly after lunch without alcohol (Patient-Rptd) 0     In a car, while stopped for a few minutes in traffic (Patient-Rptd) 1     TOTAL SCORE (normal <11) (Patient-Rptd) 4       INSOMNIA SEVERITY INDEX     Difficulty falling asleep (Patient-Rptd) 1     Difficult staying asleep (Patient-Rptd) 1     Problems waking up to early (Patient-Rptd) 1     How SATISFIED/DISSATISFIED are you with your CURRENT sleep pattern? (Patient-Rptd) 3     How NOTICEABLE to others do you think your sleep pattern is in terms of your quality of life? (Patient-Rptd) 3     How WORRIED/DISTRESSED are you about your current sleep pattern? (Patient-Rptd) 2     To what extent do you consider your sleep problem to INTERFERE with your daily fuctioning(e.g. daytime fatigue, mood, ability to  function at work/daily chores, concentration, mood,etc.) CURRENTLY? (Patient-Rptd) 2   INSOMNIA SEVERITY INDEX TOTAL SCORE  --absence of insomnia (0-7); sub-threshold insomnia (8-14); moderate insomnia (15-21); and severe insomnia (22-28)-- (Patient-Rptd) 13        PHQ-2 Score:         4/20/2025     8:40 PM 8/13/2024     8:24 AM   PHQ-2 ( 1999 [employer])   Q1: Little interest or pleasure in doing things 0 0   Q2: Feeling down, depressed or hopeless 0 0   PHQ-2 Score 0  0   Q1: Little interest or pleasure in doing things Not at all Not at all   Q2: Feeling down, depressed or hopeless Not at all Not at all   PHQ-2 Score 0 0       Patient-reported      Do you SNORE loudly (louder than talking or loud enough to be heard through closed doors)? No   Do you often feel TIRED, fatigued, or sleepy during daytime? Yes   Has anyone OBSERVED you stop breathing during your sleep? Yes   Do you have or are you being treated for high blood PRESSURE? No   BMI more than 35kg/m2? No   AGE over 50 years old? Yes   NECK circumference > 16 inches (40cm)? No   GENDER: Male? Yes   STOP-BANG Total Score (range: 0 - 8) 4         SLEEP COMPLAINTS:  Cardio-respiratory    Snoring, gasping or choking- nightly  Apneas during sleep-yes  Dyspnea- denies    Does patient have a bed partner: yes   Are sleeping problems affecting relationships in anyway :   denies            RLS Screen: When you try to relax in the evening or sleep at  night, do you ever have unpleasant, restless feelings in your  legs that can be relieved by walking or movement? (pain, discomfort or urge to move <resting> better to move around more at night) denies    Periodic limb movement: denies    Narcolepsy:  No     Sleep Behaviors:  Leg symptoms/movements: leg kicks  Motor restlessness: yes  Night terrors: sometimes  Bruxism: denies  Automatic behaviors: none    Other subjective complaints:  Anxiety or rumination denies  Pain and discomfort at  night: mild hip pain  Waking up with  heart pounding or racing: denies  GERD or aspiration:  denies  Morning Headaches:  denies  Getting up to urinate:  yes  Morning mouth dryness:  denies  Stuffy nose when you wake up:  denies       Parasomnia: (behaviors at night)  Recurring Nightmares, Eating, Sleep-talking, Sleepwalking, Teeth grinding, Kicking or Punching, Waking up Paralyzed, Waking Dream  NREM - denies recurrent persistent confusional arousal, night eating, sleep walking.    REM  - wakes up screaming from dreams 3 times per year  Safety Concerns:none           Problem List:     Patient Active Problem List   Diagnosis    Hyperlipidemia    Diverticulosis    Snoring    ANDREA (obstructive sleep apnea)            Medications:     Current Outpatient Medications   Medication Sig Dispense Refill    multivitamin, therapeutic (THERA-VIT) TABS tablet Take 1 tablet by mouth daily      rosuvastatin (CRESTOR) 10 MG tablet Take 1 tablet (10 mg) by mouth daily. 90 tablet 3    vitamin D3 (CHOLECALCIFEROL) 50 mcg (2000 units) tablet Take 2 tablets (100 mcg) by mouth daily       No current facility-administered medications for this visit.      No Known Allergies         Past Medical History:     Past Medical History:   Diagnosis Date    Diverticulosis     Hyperlipidemia 2011    ANDREA (obstructive sleep apnea) 6/10/2019    Thoracic outlet syndrome     No symptoms    Uncomplicated asthma              Past Surgical History:        Past Surgical History:   Procedure Laterality Date    COLONOSCOPY  2011    Procedure:COLONOSCOPY; Surgeon:VU ELLISON; Location: GI    COLONOSCOPY  2016            Social History:     Social History     Tobacco Use    Smoking status: Former     Current packs/day: 0.00     Types: Cigarettes     Quit date: 1997     Years since quittin.9    Smokeless tobacco: Never   Substance Use Topics    Alcohol use: Yes     Alcohol/week: 3.0 standard drinks of alcohol     Types: 3 Glasses of wine per week         Chemical  "History:     Tobacco: (Y/N) no      Uses caffeine coffee 24 oz in the am, 12 oz in the afternoon, finishing at 4-5 pm.     Medical marijuana (Y/N)    EtOH: every other week has a drink  Recreational Drugs: none    Psych Hx (hospital/outpatient care)  denies             Family History:     Family History   Problem Relation Age of Onset    Asthma Mother     Colon Cancer Mother     Macular Degeneration Father     Coronary Artery Disease Early Onset Father     Diabetes Father     Coronary Artery Disease Early Onset Paternal Grandmother     Asthma Son     Glaucoma No family hx of         Sleep Family Hx:       denies         Review of Systems:     A complete 10 point review of systems was negative other than HPI or as commented above             Physical Examination:     Vitals: /62   Pulse 99   Ht 1.779 m (5' 10.02\")   Wt 73.6 kg (162 lb 3.2 oz)   SpO2 96%   BMI 23.26 kg/m    BMI= Body mass index is 23.26 kg/m .  Mallampati Class: II.  Tonsillar Stage: 2  visible at pillars. Thick and elongated uvula.  Nasal airway with hypertrophy of the interior turbinates, pronounced on right.      GENERAL: alert and no distress  EYES: Eyes grossly normal to inspection.  No discharge or erythema, or obvious scleral/conjunctival abnormalities.  RESP: No audible wheeze, cough, or visible cyanosis.    CV: regular rates and rhythm, normal S1 S2, no S3 or S4, no murmur, click or rub, peripheral pulses strong, and no peripheral edema  SKIN: Visible skin clear. No significant rash, abnormal pigmentation or lesions.  NEURO: Cranial nerves grossly intact.  Mentation and speech appropriate for age.  PSYCH: Appropriate affect, tone, and pace of words             Data: All pertinent previous laboratory data reviewed     No results found for: \"PH\", \"PHARTERIAL\", \"PO2\", \"[id number]\", \"SAT\", \"PCO2\", \"HCO3\", \"BASEEXCESS\", \"JARET\", \"BEB\"  Lab Results   Component Value Date    TSH 3.30 02/24/2024    TSH 2.34 04/29/2019     Lab Results " "  Component Value Date    GLC 97 04/24/2025     (H) 02/24/2024     Lab Results   Component Value Date    HGB 14.4 04/15/2023    HGB 14.9 02/16/2022     Lab Results   Component Value Date    BUN 18.6 04/24/2025    BUN 19.8 02/24/2024    CR 0.84 04/24/2025    CR 0.91 02/24/2024     Lab Results   Component Value Date    AST 29 04/15/2023    AST 28 04/17/2019    ALT 35 04/15/2023    ALT 41 04/17/2019    ALKPHOS 41 04/15/2023    ALKPHOS 41 04/17/2019    BILITOTAL 1.0 04/15/2023    BILITOTAL 1.1 04/17/2019    BILICONJ 0.0 08/10/2011    BILICONJ 0.0 04/13/2005         Echocardiography: none in last 2 years    Chest x-ray: \" No acute cardiac or pulmonary abnormalities\" from 4/2019    PFT: none         Copy to: Jeff Walsh DO 7/22/2025      "

## 2025-07-22 NOTE — PATIENT INSTRUCTIONS
"Plan  Prescription for CPAP              MY TREATMENT INFORMATION FOR SLEEP APNEA-  Marcello Pastor    DOCTOR : Gilles Lancaster, DO      Frequently asked questions:  1. What is Obstructive Sleep Apnea (ANDREA)? ANDREA is the most common type of sleep apnea. Apnea means, \"without breath.\"  Apnea is most often caused by narrowing or collapse of the upper airway as muscles relax during sleep.   Almost everyone has occasional apneas. Most people with sleep apnea have had brief interruptions at night frequently for many years.  The severity of sleep apnea is related to how frequent and severe the events are.   2. What are the consequences of ANDREA? Symptoms include: feeling sleepy during the day, snoring loudly, gasping or stopping of breathing, trouble sleeping, and occasionally morning headaches or heartburn at night.  Sleepiness can be serious and even increase the risk of falling asleep while driving. Other health consequences may include development of high blood pressure and other cardiovascular disease in persons who are susceptible. Untreated ANDREA  can contribute to heart disease, stroke and diabetes.   3. What are the treatment options? In most situations, sleep apnea is a lifelong disease that must be managed with daily therapy. Medications are not effective for sleep apnea and surgery is generally not considered until other therapies have been tried. Your treatment is your choice . Continuous Positive Airway (CPAP) works right away and is the therapy that is effective in nearly everyone. An oral device to hold your jaw forward is usually the next most reliable option. Other options include postioning devices (to keep you off your back), weight loss, and surgery including a tongue pacing device. There is more detail about some of these options below.  4. Are my sleep studies covered by insurance? Although we will request verification of coverage, we advise you also check in advance of the study to ensure there is " coverage.    Important tips for those choosing CPAP and similar devices  REMEMBER-IF YOU RECEIVE A CALL FROM  [phone]-->IT IS TO SETUP A DEVICE  For new devices, sign up for device PATRICK to monitor your device for your followup visits  We encourage you to utilize the [employer] patrick or website ( [email or web address]/ ) to monitor your therapy progress and share the data with your healthcare team when you discuss your sleep apnea.                                                    Know your equipment:  CPAP is continuous positive airway pressure that prevents obstructive sleep apnea by keeping the throat from collapsing while you are sleeping. In most cases, the device is  smart  and can slowly self-adjusts if your throat collapses and keeps a record every day of how well you are treated-this information is available to you and your care team.  BPAP is bilevel positive airway pressure that keeps your throat open and also assists each breath with a pressure boost to maintain adequate breathing.  Special kinds of BPAP are used in patients who have inadequate breathing from lung or heart disease. In most cases, the device is  smart  and can slowly self-adjusts to assist breathing. Like CPAP, the device keeps a record of how well you are treated.  Your mask is your connection to the device. You get to choose what feels most comfortable and the staff will help to make sure if fits. Here: are some examples of the different masks that are available: Magnetic mask aids may assist with use but there are safety issues that should be addressed when considering with magnets* ( see end of discussion).       Key points to remember on your journey with sleep apnea:  Sleep study.  PAP devices often need to be adjusted during a sleep study to show that they are effective and adjusted right.  Good tips to remember: Try wearing just the mask during a quiet time during the day so your body adapts to wearing it. A humidifier  is recommended for comfort in most cases to prevent drying of your nose and throat. Allergy medication from your provider may help you if you are having nasal congestion.  Getting settled-in. It takes more than one night for most of us to get used to wearing a mask. Try wearing just the mask during a quiet time during the day so your body adapts to wearing it. A humidifier is recommended for comfort in most cases. Our team will work with you carefully on the first day and will be in contact within 4 days and again at 2 and 4 weeks for advice and remote device adjustments. Your therapy is evaluated by the device each day.   Use it every night. The more you are able to sleep naturally for 7-8 hours, the more likely you will have good sleep and to prevent health risks or symptoms from sleep apnea. Even if you use it 4 hours it helps. Occasionally all of us are unable to use a medical therapy, in sleep apnea, it is not dangerous to miss one night.   Communicate. Call our skilled team on the number provided on the first day if your visit for problems that make it difficult to wear the device. Over 2 out of 3 patients can learn to wear the device long-term with help from our team. Remember to call our team or your sleep providers if you are unable to wear the device as we may have other solutions for those who cannot adapt to mask CPAP therapy. It is recommended that you sleep your sleep provider within the first 3 months and yearly after that if you are not having problems.   Use it for your health. We encourage use of CPAP masks during daytime quiet periods to allow your face and brain to adapt to the sensation of CPAP so that it will be a more natural sensation to awaken to at night or during naps. This can be very useful during the first few weeks or months of adapting to CPAP though it does not help medically to wear CPAP during wakefulness and  should not be used as a strategy just to meet guidelines.  Take care of  your equipment. Make sure you clean your mask and tubing using directions every day and that your filter and mask are replaced as recommended or if they are not working.     *Masks with magnets:  Updated Contraindications  Masks with magnetic components are contraindicated for use by patients where they, or anyone in close physical contact while using the mask, have the following:   Active medical implants that interact with magnets (i.e., pacemakers, implantable cardioverter defibrillators (ICD), neurostimulators, cerebrospinal fluid (CSF) shunts, insulin/infusion pumps)   Metallic implants/objects containing ferromagnetic material (i.e., aneurysm clips/flow disruption devices, embolic coils, stents, valves, electrodes, implants to restore hearing or balance with implanted magnets, ocular implants, metallic splinters in the eye)  Updated Warning  Keep the mask magnets at a safe distance of at least 6 inches (150 mm) away from implants or medical devices that may be adversely affected by magnetic interference. This warning applies to you or anyone in close physical contact with your mask. The magnets are in the frame and lower headgear clips, with a magnetic field strength of up to 400mT. When worn, they connect to secure the mask but may inadvertently detach while asleep.  Implants/medical devices, including those listed within contraindications, may be adversely affected if they change function under external magnetic fields or contain ferromagnetic materials that attract/repel to magnetic fields (some metallic implants, e.g., contact lenses with metal, dental implants, metallic cranial plates, screws, arnulfo hole covers, and bone substitute devices). Consult your physician and  of your implant / other medical device for information on the potential adverse effects of magnetic fields.    BESIDES CPAP, WHAT OTHER THERAPIES ARE THERE?    Positioning Device  Positioning devices are generally used when sleep  apnea is mild and only occurs on your back.This example shows a pillow that straps around the waist. It may be appropriate for those whose sleep study shows milder sleep apnea that occurs primarily when lying flat on one's back. Preliminary studies have shown benefit but effectiveness at home may need to be verified by a home sleep test. These devices are generally not covered by medical insurance.  Examples of devices that maintain sleeping on the back to prevent snoring and mild sleep apnea.    Belt type body positioner  [email or web address]    Electronic reminder  [email or web address].com/            Oral Appliance  What is oral appliance therapy?  An oral appliance device fits on your teeth at night like a retainer used after having braces. The device is made by a specialized dentist and requires several visits over 1-2 months before a manufactured device is made to fit your teeth and is adjusted to prevent your sleep apnea. Once an oral device is working properly, snoring should be improved. A home sleep test may be recommended at that time if to determine whether the sleep apnea is adequately treated.       Some things to remember:  -Oral devices are often, but not always, covered by your medical insurance. Be sure to check with your insurance provider.   -If you are referred for oral therapy, you will be given a list of specialized dentists to consider or you may choose to visit the Web site of the American Academy of Dental Sleep Medicine  -Oral devices are less likely to work if you have severe sleep apnea or are extremely overweight.     If your doctor makes a referral for a Mandibular Advancement Device, you can call a certified sleep medicine office to verify your medical insurance will be accepted and for proper care in fabricating and adjusting the device  [facility]  Search engine: https://mms.aadsm.org/members/directory/search_bootstrap.php?org_id=ADSM&   Certified in Dental  Sleep Medicine    Jin Obregon  Degree: DDS  [address]  [address]  [place] [address]  Professional Phone: [phone]  Website: [email or web address]    Ricardo Barba  Degree: MARIAJOSE  [facility]  [address]  [address]  [place] [address]  Professional Phone: [phone]: [phone]    [facility]  [address] [place] [address]  [address]  [place] [address]  Appointments: [phone]  Fax: [phone]    Jackie Fry  [facility]  [address]  [place] [address]  Professional Phone: [phone]  Website: [email or web address]      Fabian Crooks   [employer]   Degree: MD MARIAJOSE   [address] [place] [address]  [address]  [place] [address]  Appointments: [phone]    Vishal Joiner  Degree: DDS  [address]  [address]  [place] [address]  Professional Phone: [phone]  Fax: [phone]    Charles Salas  Degree: MARIAJOSE  [facility]  [address]  [place] [address]  Professional Phone: [phone]  Website: [email or web address]      Ambreen Carrillo  [facility]-you should verify insurance coverage  [place] [facility] [facility]  [address]  [place] [address]  Professional Phone: [phone]  Website: [email or web address]      Leah Cabrera--DOES NOT ACCEPT INSURANCE  Degree: MARIAJOSE--you should verify insurance coverage  [facility]  [address]  [address]  [place] [address]  Professional Phone: [phone]    Monserrat Umanzor  Degree: MARIAJOSE, PhD  [facility]  [address] [place] [address]   Appointments: [phone]   Fax: [phone]     Taurus Vera- [facility]  Degree: DDS  [address] [place] [address]  Professional Phone: [phone]  Fax: [phone]  Website: [email or web address]      Pawan Juarezgerber  Degree: ROBERTS  [facility]  [address] [place] [address]    Mariona Mulet Pradera  Degree: MS MARIAJOSE  [facility]  [address] [place] [address]  Professional Phone: [phone]      Portia Soto  Degree: MS MARIAJOSE  [facility]  [address] [facility] [address] [facility] [address]  [address]  [place] [address]  Professional Phone: [phone]    Fabi Ross  Degree: ROBERTS  [facility]  [address]  [place] [address]  Appointments: [phone]    Juan Jose Merrill  Degree: MARIAJOSE  [facility]  [address] [place] [address]  Professional Phone: [phone]  Website: [email or web address]      Gen Serrano  Degree: MARIAJOSE  [facility]  [address] [place] [address]  Professional Phone: [phone]  Fax: [phone]      Benigno Hutson  Degree: MARIAJOSE  [facility]  [address] [facility]  [address]  [place] [address]    Wily Tripp   Degree: ROBERTS   [facility]   [address]   [address]  [place] [address]  Phone [phone]  Website: [email or web address]    Kalani Mtz   Degree: DDS   [address] [facility] [address]  [address]  [place] [address]  Appointments: [phone]    Conchita Azul   Degree: DDS   [address] [place] [address]   Appointments: [phone]    Rui Garcia   Degree: DDS   [address] [place] [address]   Appointments: [phone]    Aric Jaffe   Degree: DDS   [address], [place] [address]   Appointments: [phone]    Jan Felipe   Degree: DDS   [facility], [address]   [address]   [place] [address]   Appointments: [phone]    Mony Mccloud   Degree: DDS  [address] [facility]  [place] [address]  Appointments: [phone]    Andrew Carrell   [address] [facility]  [place] [address]  Appointments:  [phone]    Megan Peralta   Degree: DDS   [facility]   [address] [place] [address]   Appointments: [phone]    Favian Andrade   Degree: DDS   [address] [place] [address]   Appointments: [phone]    [facility]    Degree: MARIAJOSE  [address] [facility] [address] [facility]  [address]  [place] [address]  Appointments: [phone]    Andrew Florence   Degree: DDS   [facility]   [address]  [address]  [place] [address]  Appointment: [phone]    Sharita Girl   Degree: MS [place], FAAOP   [facility]  [address] [place] [address]  [address]  [place] [address]  Appointments: [phone]    Fabian Del Toro   Degree: DDS   [address] [facility] [place] [address]   Appointments: [phone]   Fax: [phone]        ACCEPT MEDICARE  Vishal Mcrae DDS  [address] [facility] [address], [place] [address]  [phone]; [phone] (fax)  [employer]    Orlando Shepherd DDS, MS   [facility] [address]   [address]   [place] [address]   Appointments: [phone]   Fax: [phone]     Cristhian Oshea   Degree: DMD, MSD   [facility]   [address] [facility] [facility] [address]  [address]  [place] [address]  Appointments: [phone]  Fax: [phone]    Katherine Rodriguez BDS, MS(CR). MS (OFP)  Diplomate American Board of Orofacial Pain  [facility]  [address] [facility] [address],   [place] [address]  Appointment: [phone]  Fax: [phone]        ADDITIONAL PROVIDERS    Gonzalo Mendosa DDS    [facility]  [address] [place] [address]  Appointments: [phone]     [facility]  [address] [facility]  [address]  [place] [address]  Appointments: [phone]  Fax- [phone]    Janine Espinal   Degree: DDS   [facility]    [address] [facility] [address]  [place]  [address]  Appointments: [phone]        More detailed information  An oral appliance is a small acrylic device that fits over the upper and lower teeth  (similar to a retainer or a mouth guard). This device slightly moves jaw forward, which moves the base of the tongue forward, opens the airway, improves breathing for effective treat snoring and obstructive sleep apnea in perhaps 7 out of 10 people .  The best working devices are custom-made by a dental device  after a mold is made of the teeth 1, 2, 3.  When is an oral appliance indicated?  Oral appliance therapy is recommended as a first-line treatment for patients with primary snoring, mild sleep apnea, and for patients with moderate sleep apnea who prefer appliance therapy to use of CPAP4, 5. Severity of sleep apnea is determined by sleep testing and is based on the number of respiratory events per hour of sleep.   How successful is oral appliance therapy?  The success rate of oral appliance therapy in patients with mild sleep apnea is 75-80% while in patients with moderate sleep apnea it is 50-70%. The chance of success in patients with severe sleep apnea is 40-50%. The research also shows that oral appliances have a beneficial effect on the cardiovascular health of ANDREA patients at the same magnitude as CPAP therapy7.  Oral appliances should be a second-line treatment in cases of severe sleep apnea, but if not completely successful then a combination therapy utilizing CPAP plus oral appliance therapy may be effective. Oral appliances tend to be effective in a broad range of patients although studies show that the patients who have the highest success are females, younger patients, those with milder disease, and less severe obesity. 3, 6.   Finding a dentist that practices dental sleep medicine  Specific training is available through the American Academy of Dental Sleep Medicine for dentists interested in working in the field of sleep. To find a  dentist who is educated in the field of sleep and the use of oral appliances, near you, visit the Web site of the American Academy of Dental Sleep Medicine.    References  1. Torsten, et al. Objectively measured vs self-reported compliance during oral appliance therapy for sleep-disordered breathing. Chest 2013; 144(5): 4589-4143.  2. Huber, et al. Objective measurement of compliance during oral appliance therapy for sleep-disordered breathing. Thorax 2013; 68(1): 91-96.  3. Mainor et al. Mandibular advancement devices in 620 men and women with ANDREA and snoring: tolerability and predictors of treatment success. Chest 2004; 125: 5121-4658.  4. Fantasma et al. Oral appliances for snoring and ANDREA: a review. Sleep 2006; 29: 244-262.  5. Rachael et al. Oral appliance treatment for ANDREA: an update. J Clin Sleep Med 2014; 10(2): 215-227.  6. Harleen et al. Predictors of OSAH treatment outcome. J Dent Res 2007; 86: 2190-3961.      Weight Loss:   Your Body mass index is 23.26 kg/m .    Being overweight does not necessarily mean you will have health consequences.  Those who have BMI over 30 or over 27 with existing medical conditions carries greater risk.   Weight loss decreases severity of sleep apnea in most people with obesity. For those with mild obesity who have developed snoring with weight gain, even 15-30 pound weight loss can improve and occasionally milder eliminate sleep apnea.  Structured and life-long dietary and health habits are necessary to lose weight and keep healthier weight levels.     The Comprehensive Weight loss program offers all aspects of weight loss strategies including two Non-Surgical Weight Loss Programs: Medical Weight Management and our 24 Week Healthy Lifestyle Program:  Medical Weight Management: You will meet with a Medical Weight Management Provider, as well as a Registered Dietician. The program may include medication therapy, dietary education, recommended exercise and  physical therapy programs, monthly support group meetings, and possible psychological counseling. Follow up visits with the provider or dietician are scheduled based on your progress and needs.  24 Week Healthy Lifestyle Program: This unique program is designed to give you the support of weekly appointments and activities thru a 24-week period. It may include all of the components of the basic program (above), with the addition of 11 individual Health  Visits, 24-week access to the [employer] website for over 700 online classes, and monthly support group meetings. This program has an out-of-pocket expense of $499 to cover the items that can not be billed to insurance (health coaches and [employer] access), and is non-refundable/non-transferable (you may be able to use a Health Savings Account; ask your HSA provider). There may be an optional meal replacement plan prescribed as well.   Medication therapy has been approved for the treatment of sleep apnea: The FDA approved tirzepatide (ZEPBOUND) for moderate to severe sleep apnea (apnea-hypopnea index greater than or equal to 15) in patients with BMI of greater than or equal to 30, or BMI greater than or equal to 27 with at least 1 weight-related condition such as hypertension or dyslipidemia.  Surgical management achieves meaningful long-term weight loss and improvement in health risks in most patients with more severe obesity.      Sleep Apnea Surgery:    Surgery for obstructive sleep apnea is considered generally only when other therapies fail to work. Surgery may be discussed with you if you are having a difficult time tolerating CPAP and or when there is an abnormal structure that requires surgical correction.  Nose and throat surgeries often enlarge the airway to prevent collapse.  Most of these surgeries create pain for 1-2 weeks and up to half of the most common surgeries are not effective throughout life.  You should carefully discuss the benefits and  drawbacks to surgery with your sleep provider and surgeon to determine if it is the best solution for you.   More information  Surgery for ANDREA is directed at areas that are responsible for narrowing or complete obstruction of the airway during sleep.  There are a wide range of procedures available to enlarge and/or stabilize the airway to prevent blockage of breathing in the three major areas where it can occur: the palate, tongue, and nasal regions.  Successful surgical treatment depends on the accurate identification of the factors responsible for obstructive sleep apnea in each person.  A personalized approach is required because there is no single treatment that works well for everyone.  Because of anatomic variation, consultation with an examination by a sleep surgeon is a critical first step in determining what surgical options are best for each patient.  In some cases, examination during sedation may be recommended in order to guide the selection of procedures.  Patients will be counseled about risks and benefits as well as the typical recovery course after surgery. Surgery is typically not a cure for a person s ANDREA.  However, surgery will often significantly improve one s ANDREA severity (termed  success rate ).  Even in the absence of a cure, surgery will decrease the cardiovascular risk associated with OSA7; improve overall quality of life8 (sleepiness, functionality, sleep quality, etc).      Palate Procedures:  Patients with ANDREA often have narrowing of their airway in the region of their tonsils and uvula.  The goals of palate procedures are to widen the airway in this region as well as to help the tissues resist collapse.  Modern palate procedure techniques focus on tissue conservation and soft tissue rearrangement, rather than tissue removal.  Often the uvula is preserved in this procedure. Residual sleep apnea is common in patient after pharyngoplasty with an average reduction in sleep apnea events of  33%2.      Tongue Procedures:  ExamWhile patients are awake, the muscles that surround the throat are active and keep this region open for breathing. These muscles relax during sleep, allowing the tongue and other structures to collapse and block breathing.  There are several different tongue procedures available.  Selection of a tongue base procedure depends on characteristics seen on physical exam.  Generally, procedures are aimed at removing bulky tissues in this area or preventing the back of the tongue from falling back during sleep.  Success rates for tongue surgery range from 50-62%3.    Hypoglossal Nerve Stimulation:  Hypoglossal nerve stimulation has recently received approval from the [place] Food and Drug Administration for the treatment of obstructive sleep apnea.  This is based on research showing that the system was safe and effective in treating sleep apnea6.  Results showed that the median AHI score decreased 68%, from 29.3 to 9.0. This therapy uses an implant system that senses breathing patterns and delivers mild stimulation to airway muscles, which keeps the airway open during sleep.  The system consists of three fully implanted components: a small generator (similar in size to a pacemaker), a breathing sensor, and a stimulation lead.  Using a small handheld remote, a patient turns the therapy on before bed and off upon awakening.    Candidates for this device must be greater than 18 years of age, have moderate to severe obstructive sleep apnea with less than 25% central events  (AHI between 15-65), BMI less than 35, have tried CPAP/oral appliance for at least 8 weeks without success, and have appropriate upper airway anatomy (determined by a sleep endoscopy performed by Dr. Lincoln Gustafson or Dr. Luis Manuel Reyes).     Nasal Procedures:  Nasal obstruction can interfere with nasal breathing during the day and night.  Studies have shown that relief of nasal obstruction can improve the ability of  some patients to tolerate positive airway pressure therapy for obstructive sleep apnea1.  Treatment options include medications such as nasal saline, topical corticosteroid and antihistamine sprays, and oral medications such as antihistamines or decongestants. Non-surgical treatments can include external nasal dilators for selected patients. If these are not successful by themselves, surgery can improve the nasal airway either alone or in combination with these other options.        Combination Procedures:  Combination of surgical procedures and other treatments may be recommended, particularly if patients have more than one area of narrowing or persistent positional disease.  The success rate of combination surgery ranges from 66-80%2,3.    References  Julio Cesar PHILLIPS. The Role of the Nose in Snoring and Obstructive Sleep Apnoea: An Update.  Eur Arch Otorhinolaryngol. 2011; 268: 1365-73.   Bibiana SM; Cassidy JA; Jayshree JR; Pallanch JF; Adolfo MB; Jarvis SG; Anayeli PAL. Surgical modifications of the upper airway for obstructive sleep apnea in adults: a systematic review and meta-analysis. SLEEP 2010;33(10):1548-0916. [place] ZEPEDA. Hypopharyngeal surgery in obstructive sleep apnea: an evidence-based medicine review.  Arch Otolaryngol Head Neck Surg. 2006 Feb;132(2):206-13.  Radu YH1, Kaity Y, Carlitos MELCHOR. The efficacy of anatomically based multilevel surgery for obstructive sleep apnea. Otolaryngol Head Neck Surg. 2003 Oct;129(4):327-35.  [place] ZEPEDA, Goldberg A. Hypopharyngeal Surgery in Obstructive Sleep Apnea: An Evidence-Based Medicine Review. Arch Otolaryngol Head Neck Surg. 2006 Feb;132(2):206-13.  Emmie PJ et al. Upper-Airway Stimulation for Obstructive Sleep Apnea.  N Engl J Med. 2014 Jan 9;370(2):139-49.  Dav Y et al. Increased Incidence of Cardiovascular Disease in Middle-aged Men with Obstructive Sleep Apnea. Am J Respir Crit Care Med; 2002 166: 159-165  Leisa ROGERS et al. Studying Life Effects and Effectiveness of  Palatopharyngoplasty (SLEEP) study: Subjective Outcomes of Isolated Uvulopalatopharyngoplasty. Otolaryngol Head Neck Surg. 2011; 144: 623-631.        WHAT IF I ONLY HAVE SNORING?    Mandibular advancement devices, lateral sleep positioning, long-term weight loss and treatment of nasal allergies have been shown to improve snoring.  Exercising tongue muscles with a game (https://apps.[employer]/us/patrick/Priceonomics-reduce-snoring/nd8110761673) or stimulating the tongue during the day with a device (https://doi.org/10.3390/vvo09401785) have improved snoring in some individuals.  https://www.[employer].TRAFFIQ/  https://www.sleepfoundation.org/best-anti-snoring-mouthpieces-and-mouthguards    Remember to Drive Safe... Drive Alive     Sleep health profoundly affects your health, mood, and your safety.  Thirty three percent of the population (one in three of us) is not getting enough sleep and many have a sleep disorder. Not getting enough sleep or having an untreated / undertreated sleep condition may make us sleepy without even knowing it. In fact, our driving could be dramatically impaired due to our sleep health. As your provider, here are some things I would like you to know about driving:     Here are some warning signs for impairment and dangerous drowsy driving:              -Having been awake more than 16 hours               -Looking tired               -Eyelid drooping              -Head nodding (it could be too late at this point)              -Driving for more than 30 minutes     Some things you could do to make the driving safer if you are experiencing some drowsiness:              -Stop driving and rest              -Call for transportation              -Make sure your sleep disorder is adequately treated     Some things that have been shown NOT to work when experiencing drowsiness while driving:              -Turning on the radio              -Opening windows              -Eating any  distracting  /  entertaining   foods (e.g., sunflower seeds, candy, or any other)              -Talking on the phone      Your decision may not only impact your life, but also the life of others. Please, remember to drive safe for yourself and all of us.

## 2025-08-07 ENCOUNTER — DOCUMENTATION ONLY (OUTPATIENT)
Dept: SLEEP MEDICINE | Facility: CLINIC | Age: 60
End: 2025-08-07
Payer: COMMERCIAL

## 2025-08-07 DIAGNOSIS — G47.33 OSA (OBSTRUCTIVE SLEEP APNEA): Primary | ICD-10-CM

## 2025-08-07 DIAGNOSIS — G47.01 INSOMNIA DUE TO MEDICAL CONDITION: ICD-10-CM

## 2025-08-11 ENCOUNTER — DOCUMENTATION ONLY (OUTPATIENT)
Dept: SLEEP MEDICINE | Facility: CLINIC | Age: 60
End: 2025-08-11
Payer: COMMERCIAL

## 2025-08-25 ENCOUNTER — TELEPHONE (OUTPATIENT)
Dept: SLEEP MEDICINE | Facility: CLINIC | Age: 60
End: 2025-08-25
Payer: COMMERCIAL

## (undated) RX ORDER — ALBUTEROL SULFATE 0.83 MG/ML
SOLUTION RESPIRATORY (INHALATION)
Status: DISPENSED
Start: 2019-04-23